# Patient Record
Sex: MALE | HISPANIC OR LATINO | Employment: FULL TIME | URBAN - METROPOLITAN AREA
[De-identification: names, ages, dates, MRNs, and addresses within clinical notes are randomized per-mention and may not be internally consistent; named-entity substitution may affect disease eponyms.]

---

## 2019-07-15 ENCOUNTER — RECORDS - HEALTHEAST (OUTPATIENT)
Dept: LAB | Facility: HOSPITAL | Age: 39
End: 2019-07-15

## 2019-07-15 LAB — VZV IGG SER QL IA: POSITIVE

## 2019-07-17 LAB
GAMMA INTERFERON BACKGROUND BLD IA-ACNC: 0.1 IU/ML
M TB IFN-G BLD-IMP: NEGATIVE
MITOGEN IGNF BCKGRD COR BLD-ACNC: -0.04 IU/ML
MITOGEN IGNF BCKGRD COR BLD-ACNC: -0.04 IU/ML
QTF INTERPRETATION: NORMAL
QTF MITOGEN - NIL: >10 IU/ML

## 2019-12-06 ENCOUNTER — COMMUNICATION - HEALTHEAST (OUTPATIENT)
Dept: TELEHEALTH | Facility: CLINIC | Age: 39
End: 2019-12-06

## 2019-12-06 ENCOUNTER — OFFICE VISIT - HEALTHEAST (OUTPATIENT)
Dept: FAMILY MEDICINE | Facility: CLINIC | Age: 39
End: 2019-12-06

## 2019-12-06 DIAGNOSIS — M1A.09X0 IDIOPATHIC CHRONIC GOUT OF MULTIPLE SITES WITHOUT TOPHUS: ICD-10-CM

## 2019-12-06 DIAGNOSIS — J45.40 MODERATE PERSISTENT ASTHMA WITHOUT COMPLICATION: ICD-10-CM

## 2019-12-06 ASSESSMENT — MIFFLIN-ST. JEOR: SCORE: 1827.99

## 2020-05-22 ENCOUNTER — COMMUNICATION - HEALTHEAST (OUTPATIENT)
Dept: TELEHEALTH | Facility: CLINIC | Age: 40
End: 2020-05-22

## 2020-12-20 ENCOUNTER — COMMUNICATION - HEALTHEAST (OUTPATIENT)
Dept: FAMILY MEDICINE | Facility: CLINIC | Age: 40
End: 2020-12-20

## 2020-12-22 ENCOUNTER — OFFICE VISIT - HEALTHEAST (OUTPATIENT)
Dept: FAMILY MEDICINE | Facility: CLINIC | Age: 40
End: 2020-12-22

## 2020-12-22 DIAGNOSIS — J45.40 MODERATE PERSISTENT ASTHMA WITHOUT COMPLICATION: ICD-10-CM

## 2020-12-22 DIAGNOSIS — M1A.09X0 IDIOPATHIC CHRONIC GOUT OF MULTIPLE SITES WITHOUT TOPHUS: ICD-10-CM

## 2020-12-22 RX ORDER — AZELASTINE HCL 205.5 UG/1
2 SPRAY NASAL DAILY PRN
Qty: 30 ML | Refills: 3 | Status: SHIPPED | OUTPATIENT
Start: 2020-12-22

## 2020-12-22 RX ORDER — ALBUTEROL SULFATE 90 UG/1
2 AEROSOL, METERED RESPIRATORY (INHALATION) EVERY 6 HOURS PRN
Qty: 2 INHALER | Refills: 3 | Status: SHIPPED | OUTPATIENT
Start: 2020-12-22 | End: 2022-01-19

## 2020-12-22 RX ORDER — COLCHICINE 0.6 MG/1
0.6 CAPSULE ORAL DAILY
Qty: 30 CAPSULE | Refills: 1 | Status: SHIPPED | OUTPATIENT
Start: 2020-12-22 | End: 2022-06-22

## 2021-05-28 ASSESSMENT — ASTHMA QUESTIONNAIRES
ACT_TOTALSCORE: 19
ACT_TOTALSCORE: 22

## 2021-06-03 VITALS
OXYGEN SATURATION: 96 % | DIASTOLIC BLOOD PRESSURE: 78 MMHG | HEART RATE: 71 BPM | WEIGHT: 206.9 LBS | HEIGHT: 68 IN | SYSTOLIC BLOOD PRESSURE: 114 MMHG | BODY MASS INDEX: 31.36 KG/M2

## 2021-06-04 NOTE — PROGRESS NOTES
ASSESSMENT:  1. Moderate persistent asthma without complication    We refilled all of his medications in regards to his asthma today.  He uses the Flovent a couple of times a day and I did increase him up to 2 puffs twice a day.  We can see how that impacts his usage of albuterol.  I did refill the Proventil for him in addition to the Combivent that he uses occasionally also.  He also uses the as a lasting nasal spray and I did refill that for him today as well.  We can follow-up with him in a month or so either by phone or having him come in to verify that his ACT is improving.      - fluticasone propionate (FLOVENT HFA) 220 mcg/actuation inhaler; Inhale 2 puffs 2 (two) times a day.  Dispense: 3 Inhaler; Refill: 3  - albuterol (PROVENTIL HFA) 90 mcg/actuation inhaler; Inhale 2 puffs every 6 (six) hours as needed for wheezing.  Dispense: 2 Inhaler; Refill: 3  - ipratropium-albuterol (COMBIVENT RESPIMAT)  mcg/actuation Mist inhaler; Inhale 1 puff 4 (four) times a day as needed.  Dispense: 1 Inhaler; Refill: 3  - azelastine (ASTEPRO) 0.15 % (205.5 mcg) Spry nasal spray; Apply 2 sprays into each nostril daily as needed.  Dispense: 30 mL; Refill: 3    2. Idiopathic chronic gout of multiple sites without tophus    I also did refill his colchicine which seems to work very well for gout flares when he gets them.  We did talk about possibly needing to look at his uric acid and he states that he has had that done before and that allopurinol did not work really well with him so he is wanting to stick with using the colchicine and I am fine with that right now.      - colchicine (MITIGARE) 0.6 mg capsule; Take 1 capsule (0.6 mg total) by mouth daily.  Dispense: 30 capsule; Refill: 1          PLAN:  There are no Patient Instructions on file for this visit.    No orders of the defined types were placed in this encounter.    Medications Discontinued During This Encounter   Medication Reason     ipratropium/albuterol sulfate  (COMBIVENT INHL)      fluticasone propionate (FLOVENT HFA) 220 mcg/actuation inhaler Reorder     albuterol (PROVENTIL HFA) 90 mcg/actuation inhaler Reorder     azelastine (ASTEPRO) 0.15 % (205.5 mcg) Spry nasal spray Reorder     colchicine (MITIGARE) 0.6 mg capsule Reorder       No follow-ups on file.    CHIEF COMPLAINT:  Chief Complaint   Patient presents with     [facility]     Inhaler refill, refill meds for gout, nasal spray for allergies       HISTORY OF PRESENT ILLNESS:  Zack is a 39 y.o. male presenting to the clinic today as a new patient.  He has moved here recently from [place].  He is establish care with us today.    He has a history of asthma, and notes that now that the cold weather is, he is using his inhalers a bit more.  He tends to have that pattern where he will use it more in the wintertime.  When he used to live in [place] it it was to the point where he hardly ever had to use anything at all.  He uses Flovent inhaler but has been out of that for a little bit.  He uses albuterol in combination with some Combivent inhalers so he gets a little bit of both of those when he needs to.  He does pretty well with this combination.    He also uses colchicine for gout flares.  He does not get them all that often but seems to have had more of them over the last year or so.  He has seen someone in the past about his uric acid level which evidently is pretty high but he did not tolerate the allopurinol in the past.        REVIEW OF SYSTEMS:     All other systems are negative.    PFSH:    Reviewed    Patient is new patient to the clinic. Please see past medical history, surgical history, social history and family history, all of which were completed in their entirety today.     TOBACCO USE:  Social History     Tobacco Use   Smoking Status Never Smoker   Smokeless Tobacco Never Used       VITALS:  Vitals:    12/06/19 0824   BP: 114/78   Patient Site: Left Arm   Patient Position: Sitting   Cuff Size:  "Adult Regular   Pulse: 71   SpO2: 96%   Weight: 206 lb 14.4 oz (93.8 kg)   Height: 5' 8\" (1.727 m)     Wt Readings from Last 3 Encounters:   12/06/19 206 lb 14.4 oz (93.8 kg)     Body mass index is 31.46 kg/m .    PHYSICAL EXAM:  Constitutional:  Well appearing patient in no acute distress.  Vitals:  Per nursing notes.    HEENT:  Normocephalic, atraumatic.  Ears are clear bilaterally, with no fluid or redness, and landmarks visible.  Pupils are equal and reactive to light, extraocular muscles intact, visual fields are full.  Nose is normal, and oropharynx is clear without redness.    Neck is without lymphadenopathy.    Lungs:  Clear to auscultation bilaterally without wheezes, rales or rhonchi.   Cardiac:  Regular rate and rhythm without murmurs, rubs, or gallops.     Legs show no cyanosis, clubbing or edema.  Palpation of the distal pulses are normal and symmetric.    Neurologic:  Cranial nerves II-XII intact.   Normal and symmetric reflexes in extremities, with normal strength and sensation.  Psychiatric:  Mood appropriate, memory intact.         MEDICATIONS:  Current Outpatient Medications   Medication Sig Dispense Refill     albuterol (PROVENTIL HFA) 90 mcg/actuation inhaler Inhale 2 puffs every 6 (six) hours as needed for wheezing. 2 Inhaler 3     azelastine (ASTEPRO) 0.15 % (205.5 mcg) Spry nasal spray Apply 2 sprays into each nostril daily as needed. 30 mL 3     colchicine (MITIGARE) 0.6 mg capsule Take 1 capsule (0.6 mg total) by mouth daily. 30 capsule 1     fluticasone propionate (FLOVENT HFA) 220 mcg/actuation inhaler Inhale 2 puffs 2 (two) times a day. 3 Inhaler 3     ipratropium-albuterol (COMBIVENT RESPIMAT)  mcg/actuation Mist inhaler Inhale 1 puff 4 (four) times a day as needed. 1 Inhaler 3     No current facility-administered medications for this visit.           "

## 2021-06-13 NOTE — PROGRESS NOTES
Zack Nelson is a 40 y.o. male who is being evaluated via a billable telephone visit.          ASSESSMENT:  1. Moderate persistent asthma without complication    I did refill his asthma medications for him.  As mentioned, he really does not use the Combivent very much but only when he needs to be out and about it does not bring his other inhalers with him.  He uses the Flovent daily or most days.    He only uses the albuterol rarely and he does use the azelastine pretty regularly as it helps with his allergies.    I suggested that he come in for a physical in the next couple of months and we can have him come in fasting and do a bit more before thorough work-up and get some labs done at that time.      - ipratropium-albuteroL (COMBIVENT RESPIMAT)  mcg/actuation Mist inhaler; Inhale 1 puff 4 (four) times a day as needed.  Dispense: 1 Inhaler; Refill: 3  - fluticasone propionate (FLOVENT HFA) 220 mcg/actuation inhaler; Inhale 2 puffs 2 (two) times a day.  Dispense: 3 Inhaler; Refill: 3  - albuterol (PROVENTIL HFA) 90 mcg/actuation inhaler; Inhale 2 puffs every 6 (six) hours as needed for wheezing.  Dispense: 2 Inhaler; Refill: 3  - azelastine (ASTEPRO) 0.15 % (205.5 mcg) Spry nasal spray; Apply 2 sprays into each nostril daily as needed.  Dispense: 30 mL; Refill: 3    2. Idiopathic chronic gout of multiple sites without tophus    I did refill his colchicine, he does not use that all that often either but he would like to have a fresh prescription should he need i.    - colchicine (MITIGARE) 0.6 mg capsule; Take 1 capsule (0.6 mg total) by mouth daily.  Dispense: 30 capsule; Refill: 1        Preventive Health Care:      PLAN:  There are no Patient Instructions on file for this visit.    No orders of the defined types were placed in this encounter.      No follow-ups on file.    CHIEF COMPLAINT:  Chief Complaint   Patient presents with     Medication Refill     Med check - for refills       HISTORY OF PRESENT  "ILLNESS:  Zack is a 40 y.o. male calling in to the clinic today for refills of his medications.  He has asthma and uses the inhalers as listed on above.  He seems to think that things are going pretty well and his asthma control test is under good control here today.  He has not been all that sick with any bit of illnesses this last year.  He uses his Flovent pretty much every day.  Uses the Combivent more rarely and the albuterol very rarely.    He also needs a refill of his colchicine which he uses very rarely for his gout only when he gets flares which have not been very frequently this past year.    REVIEW OF SYSTEMS:     All other systems are negative.    PFSH:    Reviewed      TOBACCO USE:  Social History     Tobacco Use   Smoking Status Never Smoker   Smokeless Tobacco Never Used         The visit lasted a total of 15 minutes   CA intake time  10 minutes    Telephone Consent was completed by  staff and confirmed by     I have reviewed and updated the patient's Past Medical History, Social History, Family History as appropriate.    MEDICATIONS: Reviewed and updated per CA and MD  Current Outpatient Medications   Medication Sig Dispense Refill     albuterol (PROVENTIL HFA) 90 mcg/actuation inhaler Inhale 2 puffs every 6 (six) hours as needed for wheezing. 2 Inhaler 3     azelastine (ASTEPRO) 0.15 % (205.5 mcg) Spry nasal spray Apply 2 sprays into each nostril daily as needed. 30 mL 3     colchicine (MITIGARE) 0.6 mg capsule Take 1 capsule (0.6 mg total) by mouth daily. 30 capsule 1     fluticasone propionate (FLOVENT HFA) 220 mcg/actuation inhaler Inhale 2 puffs 2 (two) times a day. 3 Inhaler 3     ipratropium-albuteroL (COMBIVENT RESPIMAT)  mcg/actuation Mist inhaler Inhale 1 puff 4 (four) times a day as needed. 1 Inhaler 3     No current facility-administered medications for this visit.            The patient has been notified of following:     \"This telephone visit will be conducted via a " "call between you and your physician/provider. We have found that certain health care needs can be provided without the need for a physical exam.  This service lets us provide the care you need with a short phone conversation.  If a prescription is necessary we can send it directly to your pharmacy.  If lab work is needed we can place an order for that and you can then stop by our lab to have the test done at a later time.    If during the course of the call the physician/provider feels a telephone visit is not appropriate, you will not be charged for this service.\"      The patient has been notified of following:     \"This telephone visit will be conducted via a call between you and your physician/provider. We have found that certain health care needs can be provided without the need for a physical exam.  This service lets us provide the care you need with a short phone conversation.  If a prescription is necessary we can send it directly to your pharmacy.  If lab work is needed we can place an order for that and you can then stop by our lab to have the test done at a later time.    Telephone visits are billed at different rates depending on your insurance coverage. During this emergency period, for some insurers they may be billed the same as an in-person visit.  Please reach out to your insurance provider with any questions.    If during the course of the call the physician/provider feels a telephone visit is not appropriate, you will not be charged for this service.\"    Patient has given verbal consent to a Telephone visit? Yes    What phone number would you like to be contacted at? N/A    Patient would like to receive their AVS by AVS Preference: Edson.        GILSON Maier MD   "

## 2021-06-16 PROBLEM — J45.40 MODERATE PERSISTENT ASTHMA WITHOUT COMPLICATION: Status: ACTIVE | Noted: 2019-12-06

## 2021-06-16 PROBLEM — M1A.09X0 IDIOPATHIC CHRONIC GOUT OF MULTIPLE SITES WITHOUT TOPHUS: Status: ACTIVE | Noted: 2019-12-06

## 2021-06-20 ENCOUNTER — HEALTH MAINTENANCE LETTER (OUTPATIENT)
Age: 41
End: 2021-06-20

## 2021-07-13 ENCOUNTER — VIRTUAL VISIT (OUTPATIENT)
Dept: URGENT CARE | Facility: CLINIC | Age: 41
End: 2021-07-13
Payer: COMMERCIAL

## 2021-07-13 DIAGNOSIS — R52 BODY ACHES: ICD-10-CM

## 2021-07-13 DIAGNOSIS — R50.9 FEVER AND CHILLS: Primary | ICD-10-CM

## 2021-07-13 DIAGNOSIS — R11.0 NAUSEA: ICD-10-CM

## 2021-07-13 DIAGNOSIS — R50.9 FEVER AND CHILLS: ICD-10-CM

## 2021-07-13 LAB — SARS-COV-2 RNA RESP QL NAA+PROBE: NEGATIVE

## 2021-07-13 PROCEDURE — 99213 OFFICE O/P EST LOW 20 MIN: CPT | Mod: TEL | Performed by: PHYSICIAN ASSISTANT

## 2021-07-13 NOTE — PROGRESS NOTES
Zack is a 40 year old who is being evaluated via a billable telephone visit.      Assessment & Plan     Fever and chills  - Symptomatic COVID-19 Virus (Coronavirus) by PCR Nasopharyngeal; Future    Nausea  - Symptomatic COVID-19 Virus (Coronavirus) by PCR Nasopharyngeal; Future    Body aches  - Symptomatic COVID-19 Virus (Coronavirus) by PCR Nasopharyngeal; Future    I will get covid testing and we will follow up with results and further recommendations.     Trish Rangel PA-C  [facility]    Subjective   Zack is a 40 year old who presents for the following health issues : flu like symptoms    HPI - last week he started having some stomach issues, diarrhea and nausea. He developed fevers, body aches and continued stomach cramping yesterday. He has not had a sore throat, has not had blood or mucus in stool. He has not had recent travel or exposure to new water or food sources. 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       Review of Systems   Constitutional, HEENT, cardiovascular, pulmonary, gi and gu systems are negative, except as otherwise noted.      Objective           Vitals:  No vitals were obtained today due to virtual visit.    Physical Exam   alert and no distress  PSYCH: Alert and oriented times 3; coherent speech, normal   rate and volume, able to articulate logical thoughts, able   to abstract reason, no tangential thoughts, no hallucinations   or delusions  His affect is normal  RESP: No cough, no audible wheezing, able to talk in full sentences  Remainder of exam unable to be completed due to telephone visits            Phone call duration: 5 minutes

## 2021-10-11 ENCOUNTER — HEALTH MAINTENANCE LETTER (OUTPATIENT)
Age: 41
End: 2021-10-11

## 2021-12-31 ENCOUNTER — OFFICE VISIT (OUTPATIENT)
Dept: FAMILY MEDICINE | Facility: CLINIC | Age: 41
End: 2021-12-31
Payer: COMMERCIAL

## 2021-12-31 ENCOUNTER — TELEPHONE (OUTPATIENT)
Dept: FAMILY MEDICINE | Facility: CLINIC | Age: 41
End: 2021-12-31

## 2021-12-31 VITALS
OXYGEN SATURATION: 95 % | HEART RATE: 72 BPM | DIASTOLIC BLOOD PRESSURE: 70 MMHG | WEIGHT: 203 LBS | BODY MASS INDEX: 30.77 KG/M2 | SYSTOLIC BLOOD PRESSURE: 116 MMHG | HEIGHT: 68 IN

## 2021-12-31 DIAGNOSIS — Z13.1 SCREENING FOR DIABETES MELLITUS: ICD-10-CM

## 2021-12-31 DIAGNOSIS — J45.40 MODERATE PERSISTENT ASTHMA WITHOUT COMPLICATION: ICD-10-CM

## 2021-12-31 DIAGNOSIS — M1A.09X0 IDIOPATHIC CHRONIC GOUT OF MULTIPLE SITES WITHOUT TOPHUS: ICD-10-CM

## 2021-12-31 DIAGNOSIS — Z13.220 SCREENING, LIPID: ICD-10-CM

## 2021-12-31 DIAGNOSIS — Z00.00 ROUTINE MEDICAL EXAM: Primary | ICD-10-CM

## 2021-12-31 PROCEDURE — 99396 PREV VISIT EST AGE 40-64: CPT | Performed by: FAMILY MEDICINE

## 2021-12-31 ASSESSMENT — MIFFLIN-ST. JEOR: SCORE: 1805.3

## 2021-12-31 ASSESSMENT — ASTHMA QUESTIONNAIRES
ACT_TOTALSCORE: 17
QUESTION_1 LAST FOUR WEEKS HOW MUCH OF THE TIME DID YOUR ASTHMA KEEP YOU FROM GETTING AS MUCH DONE AT WORK, SCHOOL OR AT HOME: NONE OF THE TIME
QUESTION_5 LAST FOUR WEEKS HOW WOULD YOU RATE YOUR ASTHMA CONTROL: SOMEWHAT CONTROLLED
QUESTION_4 LAST FOUR WEEKS HOW OFTEN HAVE YOU USED YOUR RESCUE INHALER OR NEBULIZER MEDICATION (SUCH AS ALBUTEROL): ONE OR TWO TIMES PER DAY
QUESTION_2 LAST FOUR WEEKS HOW OFTEN HAVE YOU HAD SHORTNESS OF BREATH: THREE TO SIX TIMES A WEEK
QUESTION_3 LAST FOUR WEEKS HOW OFTEN DID YOUR ASTHMA SYMPTOMS (WHEEZING, COUGHING, SHORTNESS OF BREATH, CHEST TIGHTNESS OR PAIN) WAKE YOU UP AT NIGHT OR EARLIER THAN USUAL IN THE MORNING: ONCE OR TWICE

## 2021-12-31 NOTE — PROGRESS NOTES
"  ASSESSMENT/PLAN:   (Z00.00) Routine medical exam  (primary encounter diagnosis)  Comment: Generally the patient is doing very well.  We discussed healthy lifestyles, including adequate exercise (3-4 times a week for 20-30 minutes), and a healthy diet.  Patient should return for annual physicals, and we can also see them here as needed.         (J45.40) Moderate persistent asthma without complication  Comment: He is doing well on his albuterol inhaler combined with the Flovent inhaler that he uses for his asthma.  That is worked well for him in the past and he was given refills as necessary today.        (M1A.09X0) Idiopathic chronic gout of multiple sites without tophus  Comment: We will check uric acid level today to see where he has had since his flare is resolved that we saw him for before.  We can follow-up on the results when they become available.      Plan: Uric acid            (Z13.220) Screening, lipid  Comment:   Plan: Lipid panel reflex to direct LDL Fasting            (Z13.1) Screening for diabetes mellitus  Comment:   Plan: Comprehensive metabolic panel              Patient has been advised of split billing requirements and indicates understanding: Yes  COUNSELING:   Reviewed preventive health counseling, as reflected in patient instructions       Regular exercise       Healthy diet/nutrition       Alcohol Use        Colon cancer screening       Prostate cancer screening    Estimated body mass index is 31.46 kg/m  as calculated from the following:    Height as of 12/6/19: 1.727 m (5' 8\").    Weight as of 12/6/19: 93.8 kg (206 lb 14.4 oz).     Weight management plan: Discussed healthy diet and exercise guidelines    He reports that he has never smoked. He has never used smokeless tobacco.        SUBJECTIVE:   CC: Zack Nelson is an 41 year old male who presents for preventative health visit.       Patient has been advised of split billing requirements and indicates understanding: Yes  Healthy " Habits:     Getting at least 3 servings of Calcium per day:  Yes    Bi-annual eye exam:  Yes    Dental care twice a year:  Yes    Sleep apnea or symptoms of sleep apnea:  None    Diet:  Regular (no restrictions)    Frequency of exercise:  None    Taking medications regularly:  Yes    Medication side effects:  None    PHQ-2 Total Score: 0    Additional concerns today:  No              Today's PHQ-2 Score:   PHQ-2 ( 1999 Pfizer) 12/31/2021   Q1: Little interest or pleasure in doing things 0   Q2: Feeling down, depressed or hopeless 0   PHQ-2 Score 0   Q1: Little interest or pleasure in doing things Not at all   Q2: Feeling down, depressed or hopeless Not at all   PHQ-2 Score 0       Abuse: Current or Past(Physical, Sexual or Emotional)- No  Do you feel safe in your environment? Yes    Have you ever done Advance Care Planning? (For example, a Health Directive, POLST, or a discussion with a medical provider or your loved ones about your wishes): No, advance care planning information given to patient to review.  Patient declined advance care planning discussion at this time.    Social History     Tobacco Use     Smoking status: Never Smoker     Smokeless tobacco: Never Used   Substance Use Topics     Alcohol use: Yes     Alcohol/week: 2.0 standard drinks         Alcohol Use 12/31/2021   Prescreen: >3 drinks/day or >7 drinks/week? No       Last PSA: No results found for: PSA    Reviewed orders with patient. Reviewed health maintenance and updated orders accordingly - Yes  Labs reviewed in EPIC  BP Readings from Last 3 Encounters:   12/31/21 116/70   12/06/19 114/78    Wt Readings from Last 3 Encounters:   12/31/21 92.1 kg (203 lb)   12/06/19 93.8 kg (206 lb 14.4 oz)                  Patient Active Problem List   Diagnosis     Moderate persistent asthma without complication     Idiopathic chronic gout of multiple sites without tophus     History reviewed. No pertinent surgical history.    Social History     Tobacco Use      Smoking status: Never Smoker     Smokeless tobacco: Never Used   Substance Use Topics     Alcohol use: Not Currently     Alcohol/week: 2.0 standard drinks     Family History   Problem Relation Age of Onset     Arthritis Mother      Hyperlipidemia Mother      Diabetes No family hx of      Cancer No family hx of          Current Outpatient Medications   Medication Sig Dispense Refill     albuterol (PROVENTIL HFA) 90 mcg/actuation inhaler [ALBUTEROL (PROVENTIL HFA) 90 MCG/ACTUATION INHALER] Inhale 2 puffs every 6 (six) hours as needed for wheezing. 2 Inhaler 3     azelastine (ASTEPRO) 0.15 % (205.5 mcg) Spry nasal spray [AZELASTINE (ASTEPRO) 0.15 % (205.5 MCG) SPRY NASAL SPRAY] Apply 2 sprays into each nostril daily as needed. 30 mL 3     colchicine (MITIGARE) 0.6 mg capsule [COLCHICINE (MITIGARE) 0.6 MG CAPSULE] Take 1 capsule (0.6 mg total) by mouth daily. 30 capsule 1     fluticasone propionate (FLOVENT HFA) 220 mcg/actuation inhaler [FLUTICASONE PROPIONATE (FLOVENT HFA) 220 MCG/ACTUATION INHALER] Inhale 2 puffs 2 (two) times a day. 3 Inhaler 3     ipratropium-albuteroL (COMBIVENT RESPIMAT)  mcg/actuation Mist inhaler [IPRATROPIUM-ALBUTEROL (COMBIVENT RESPIMAT)  MCG/ACTUATION MIST INHALER] Inhale 1 puff 4 (four) times a day as needed. 1 Inhaler 3     No Known Allergies    Reviewed and updated as needed this visit by clinical staff                Reviewed and updated as needed this visit by Provider               Past Medical History:   Diagnosis Date     Uncomplicated asthma       History reviewed. No pertinent surgical history.    Review of Systems  CONSTITUTIONAL: NEGATIVE for fever, chills, change in weight  INTEGUMENTARY/SKIN: NEGATIVE for worrisome rashes, moles or lesions  EYES: NEGATIVE for vision changes or irritation  ENT: NEGATIVE for ear, mouth and throat problems  RESP: NEGATIVE for significant cough or SOB  CV: NEGATIVE for chest pain, palpitations or peripheral edema  GI: NEGATIVE for  nausea, abdominal pain, heartburn, or change in bowel habits   male: negative for dysuria, hematuria, decreased urinary stream, erectile dysfunction, urethral discharge  MUSCULOSKELETAL: NEGATIVE for significant arthralgias or myalgia  NEURO: NEGATIVE for weakness, dizziness or paresthesias  PSYCHIATRIC: NEGATIVE for changes in mood or affect    OBJECTIVE:   There were no vitals taken for this visit.    Physical Exam  GENERAL: healthy, alert and no distress  EYES: Eyes grossly normal to inspection, PERRL and conjunctivae and sclerae normal  HENT: ear canals and TM's normal, nose and mouth without ulcers or lesions  NECK: no adenopathy, no asymmetry, masses, or scars and thyroid normal to palpation  RESP: lungs clear to auscultation - no rales, rhonchi or wheezes  CV: regular rate and rhythm, normal S1 S2, no S3 or S4, no murmur, click or rub, no peripheral edema and peripheral pulses strong  ABDOMEN: soft, nontender, no hepatosplenomegaly, no masses and bowel sounds normal  MS: no gross musculoskeletal defects noted, no edema  SKIN: no suspicious lesions or rashes  NEURO: Normal strength and tone, mentation intact and speech normal  PSYCH: mentation appears normal, affect normal/bright    Diagnostic Test Results:  Labs reviewed in Epic  No results found for any visits on 12/31/21.    Varun Langston MD  [facility]

## 2022-01-01 ASSESSMENT — ASTHMA QUESTIONNAIRES: ACT_TOTALSCORE: 17

## 2022-01-03 NOTE — PROGRESS NOTES
Patient is on our lab schedule for Thursday. Appt notes say fasting. Please review and place orders as needed. Thanks

## 2022-01-19 DIAGNOSIS — J45.40 MODERATE PERSISTENT ASTHMA WITHOUT COMPLICATION: ICD-10-CM

## 2022-01-19 NOTE — TELEPHONE ENCOUNTER
1-19-22  Reason for Call:  Medication    Do you use a [facility]?  [facility] on [address]     Name of the medication requested:   albuterol (PROVENTIL HFA) 90 mcg/actuation inhaler  &   fluticasone propionate (FLOVENT HFA) 220 mcg/actuation inhaler    Other request: none    Can we leave a detailed message on this number? YES    Phone number patient can be reached at: Cell number on file:    Telephone Information:   Mobile [phone]       Best Time: antyime    Call taken on 1/19/2022 at 1:39 PM by Sharyn Ma

## 2022-01-21 ENCOUNTER — MYC REFILL (OUTPATIENT)
Dept: FAMILY MEDICINE | Facility: CLINIC | Age: 42
End: 2022-01-21
Payer: COMMERCIAL

## 2022-01-21 DIAGNOSIS — J45.40 MODERATE PERSISTENT ASTHMA WITHOUT COMPLICATION: ICD-10-CM

## 2022-01-21 RX ORDER — ALBUTEROL SULFATE 90 UG/1
2 AEROSOL, METERED RESPIRATORY (INHALATION) EVERY 6 HOURS PRN
Qty: 36 G | Refills: 3 | Status: SHIPPED | OUTPATIENT
Start: 2022-01-21 | End: 2023-01-17

## 2022-01-22 ENCOUNTER — MYC REFILL (OUTPATIENT)
Dept: FAMILY MEDICINE | Facility: CLINIC | Age: 42
End: 2022-01-22
Payer: COMMERCIAL

## 2022-01-22 DIAGNOSIS — J45.40 MODERATE PERSISTENT ASTHMA WITHOUT COMPLICATION: ICD-10-CM

## 2022-01-24 NOTE — TELEPHONE ENCOUNTER
"Routing refill request to provider for review/approval because:  ACT score out of range.    Last Written Prescription Date:  12/22/20  Last Fill Quantity: 1,  # refills: 3   Last office visit provider:  12/21/21     Requested Prescriptions   Pending Prescriptions Disp Refills     ipratropium-albuterol (COMBIVENT RESPIMAT)  MCG/ACT inhaler       Sig: Inhale 1 puff into the lungs 4 times daily as needed       Asthma Maintenance Inhalers - Anticholinergics Failed - 1/21/2022 12:40 PM        Failed - Asthma control assessment score within normal limits in last 6 months     Please review ACT score.           Passed - Patient is age 12 years or older        Passed - Medication is active on med list        Passed - Recent (6 mo) or future (30 days) visit within the authorizing provider's specialty     Patient had office visit in the last 6 months or has a visit in the next 30 days with authorizing provider or within the authorizing provider's specialty.  See \"Patient Info\" tab in inbasket, or \"Choose Columns\" in Meds & Orders section of the refill encounter.           Short-Acting Beta Agonist Inhalers Protocol  Failed - 1/21/2022 12:40 PM        Failed - Asthma control assessment score within normal limits in last 6 months     Please review ACT score.           Passed - Patient is age 12 or older        Passed - Medication is active on med list        Passed - Recent (6 mo) or future (30 days) visit within the authorizing provider's specialty     Patient had office visit in the last 6 months or has a visit in the next 30 days with authorizing provider or within the authorizing provider's specialty.  See \"Patient Info\" tab in inbasket, or \"Choose Columns\" in Meds & Orders section of the refill encounter.           Asthma Nebs Protocol Failed - 1/21/2022 12:40 PM        Failed - Asthma control assessment score within normal limits in last 6 months     Please review ACT score.           Passed - Patient is age 4 years " "or older        Passed - Medication is active on med list        Passed - Recent (6 mo) or future (30 days) visit within the authorizing provider's specialty     Patient had office visit in the last 6 months or has a visit in the next 30 days with authorizing provider or within the authorizing provider's specialty.  See \"Patient Info\" tab in inbasket, or \"Choose Columns\" in Meds & Orders section of the refill encounter.                 Yobani Hernandez RN 01/24/22 8:11 AM  "

## 2022-01-24 NOTE — TELEPHONE ENCOUNTER
Last OV: 12/31/2021    Last filled: 12/22/2020    Patient states he is out of medication.      Valorie Evans

## 2022-01-27 DIAGNOSIS — J45.40 MODERATE PERSISTENT ASTHMA WITHOUT COMPLICATION: ICD-10-CM

## 2022-01-27 NOTE — TELEPHONE ENCOUNTER
Reason for Call:  Medication or medication refill:    Do you use a [facility]?  Name of the pharmacy and phone number for the current request:  [facility] [place]    Name of the medication requested: Flovent inhaler    Patient has been out of medication for a week    Can we leave a detailed message on this number? YES    Phone number patient can be reached at: Cell number on file:    Telephone Information:   Mobile [phone]       Best Time: anytime    Call taken on 1/27/2022 at 2:37 PM by Valorie Evans

## 2022-01-31 ENCOUNTER — TELEPHONE (OUTPATIENT)
Dept: FAMILY MEDICINE | Facility: CLINIC | Age: 42
End: 2022-01-31
Payer: COMMERCIAL

## 2022-01-31 NOTE — TELEPHONE ENCOUNTER
Received fax from pharmacy requesting a PA for the following medications:    ArmonAir Digihaler 232 mcg/ACT    Key: [id number]

## 2022-02-02 DIAGNOSIS — J45.40 MODERATE PERSISTENT ASTHMA WITHOUT COMPLICATION: Primary | ICD-10-CM

## 2022-02-02 RX ORDER — ALBUTEROL SULFATE 0.83 MG/ML
2.5 SOLUTION RESPIRATORY (INHALATION) EVERY 6 HOURS PRN
Qty: 90 ML | Refills: 3 | Status: SHIPPED | OUTPATIENT
Start: 2022-02-02

## 2022-02-02 RX ORDER — FLUTICASONE PROPIONATE 220 UG/1
1 AEROSOL, METERED RESPIRATORY (INHALATION) 2 TIMES DAILY
Qty: 12 G | Refills: 3 | Status: SHIPPED | OUTPATIENT
Start: 2022-02-02 | End: 2022-05-31

## 2022-02-02 NOTE — TELEPHONE ENCOUNTER
Spoke with covering provider and they did fill those for patient. Called patient and left message that those have been sent into his pharmacy for him.  Brittaney Mendiola LPN     10

## 2022-02-02 NOTE — TELEPHONE ENCOUNTER
Patient calling in requesting that his Flovent be filled. Insurance does not cover the Armonair Respiclick but states that Flovent has been covered this whole time. He has been out of medication for a month and is very upset. He states he would like to discuss this with someone. He feels like Dr. Langston is doing this on purpose because there is no other explanation for this. I did let him know it was not being done on purpose and I will personally work on this to get this filled for him so he does not have to go to [facility]. He states he has been using his hernan albuterol because he has had increase in wheezing and shortness of breath since being out of his daily inhaler. He is currently using albuterol Nebulizer and his albuterol inhaler to help control symptoms. He is requesting that Flovent and an albuterol neb be sent into his pharmacy so that he can manage his symptoms until his Flovent is back in his system. Dr. Langston is out of office today but back tomorrow 2/3. Will have a covering provider address refills.  Brittaney Mendiola LPN

## 2022-02-04 NOTE — TELEPHONE ENCOUNTER
Central Prior Authorization Team   Phone: [phone]    PA Initiation    Medication: ArmonAir Digihaler 232MCG/ACT aerosol powder  Insurance Company: [employer] - Phone [phone] Fax [phone]  Pharmacy Filling the Rx: [id number] [place], [place] - [address] AT [facility] [address]  Filling Pharmacy Phone: [phone]  Filling Pharmacy Fax:    Start Date: 2/4/2022   Manually faxed request

## 2022-02-07 NOTE — TELEPHONE ENCOUNTER
Prior Authorization Approval    Authorization Effective Date: 2/2/2022  Authorization Expiration Date: 2/2/2023  Medication: ArmonAir Digihaler 232MCG/ACT aerosol powder  Approved Dose/Quantity:    Reference #:     Insurance Company: [employer] - Phone [phone] Fax [phone]  Expected CoPay:       CoPay Card Available:      Foundation Assistance Needed:    Which Pharmacy is filling the prescription (Not needed for infusion/clinic administered): [id number] [place], [place] - [facility] [address] AT [facility]  Pharmacy Notified: Yes  Patient Notified: Yes

## 2022-05-03 ENCOUNTER — E-VISIT (OUTPATIENT)
Dept: URGENT CARE | Facility: CLINIC | Age: 42
End: 2022-05-03
Payer: COMMERCIAL

## 2022-05-03 DIAGNOSIS — R05.9 COUGH: Primary | ICD-10-CM

## 2022-05-03 PROCEDURE — 99421 OL DIG E/M SVC 5-10 MIN: CPT | Performed by: FAMILY MEDICINE

## 2022-05-03 NOTE — PATIENT INSTRUCTIONS
Dear Zack,      Based on your responses, you may have coronavirus (COVID-19). It also could be pneumonia or influenza. If you are shortness of breath or if that fever is not improving the coming 2-3 days you should be seen in clinic or .    Will I be tested for COVID-19?  We would like to test you for COVID. I have placed orders for this test.     To schedule: go to your [email or web address] home page and scroll down to the section that says  You have an appointment that needs to be scheduled  and click the large green button that says  Schedule Now  and follow the steps to find the next available openings.    If you are unable to complete these [email or web address] scheduling steps, please call [phone] to schedule your testing.     These guidelines are for isolating before returning to work, school or .     For employers, schools and day cares: This is an official notice for this person s medical guidelines for returning in-person.     For health care sites: The CDC gives different isolation and quarantine guidelines for healthcare sites, please check with these sites before arriving.     How do I self-isolate?  You isolate when you have symptoms of COVID or a test shows you have COVID, even if you don t have symptoms.     If you DO have symptoms:  o Stay home and away from others  - For at least 5 days after your symptoms started, AND   - You are fever free for 24 hours (with no medicine that reduces fever), AND  - Your other symptoms are better.  o Wear a mask for 10 full days any time you are around others.    If you DON T have symptoms:  o Stay at home and away from others for at least 5 days after your positive test.  o Wear a mask for 10 full days any time you are around others.    How can I take care of myself?  Over the counter medications may help with your symptoms such as runny or stuffy nose, cough, chills, or fever.  Talk to your care team about your options.     Some people are at high risk of severe illness  (for example, you have a weak immune system, you re 65 years or older, or you have certain medical problems). If your risk is high and your symptoms started in the last 5 to 7 days, we strongly recommend for you to get COVID treatment as soon as possible. Paxlovid, Molnupiravir and the monoclonal antibody treatments are proven safe and effective, make you feel better faster, and prevent hospitalization and death.       To schedule an appointment to discuss COVID treatment, request an appointment on [employer] (select  COVID-19 Treatment ) or call [facility] ([phone]), press 7.      Get lots of rest. Drink extra fluids (unless a doctor has told you not to)    Take Tylenol (acetaminophen) or ibuprofen for fever or pain. If you have liver or kidney problems, ask your family doctor if it's okay to take Tylenol or ibuprofen    Take over the counter medications for your symptoms, as directed by your doctor. You may also talk to your pharmacist.      If you have other health problems (like cancer, heart failure, an organ transplant or severe kidney disease): Call your specialty clinic if you don't feel better in the next 2 days.    Know when to call 911. Emergency warning signs include:  o Trouble breathing or shortness of breath  o Pain or pressure in the chest that doesn't go away  o Feeling confused like you haven't felt before, or not being able to wake up  o Bluish-colored lips or face    Where can I get more information?    [facility] - About COVID-19: [email or web address]."[email or web address]/     CDC - What to Do If You're Sick: https://www.cdc.gov/coronavirus/2019-ncov/if-you-are-sick/index.html     CDC - Quarantine & Isolation: https://www.cdc.gov/coronavirus/2019-ncov/your-health/quarantine-isolation.html     [facility] clinical trials (COVID-19 research studies): clinicalaffairs.[facility]/n-clinical-trials    Below are the COVID-19 hotlines at the [place] [facility] ([facility]).  Interpreters are available.  o For health questions: Call 260-324-5350 or 1-457.792.4622 (7 a.m. to 7 p.m.)  o For questions about schools and childcare: Call 587-139-5088 or 1-851.467.7847 (7 a.m. to 7 p.m.)

## 2022-05-31 ENCOUNTER — VIRTUAL VISIT (OUTPATIENT)
Dept: INTERNAL MEDICINE | Facility: CLINIC | Age: 42
End: 2022-05-31
Payer: COMMERCIAL

## 2022-05-31 DIAGNOSIS — J01.90 ACUTE SINUSITIS WITH SYMPTOMS GREATER THAN 10 DAYS: Primary | ICD-10-CM

## 2022-05-31 DIAGNOSIS — J30.81 ALLERGIC RHINITIS DUE TO ANIMALS: ICD-10-CM

## 2022-05-31 DIAGNOSIS — J45.40 MODERATE PERSISTENT ASTHMA WITHOUT COMPLICATION: ICD-10-CM

## 2022-05-31 PROCEDURE — 99214 OFFICE O/P EST MOD 30 MIN: CPT | Mod: GT | Performed by: INTERNAL MEDICINE

## 2022-05-31 RX ORDER — BUDESONIDE AND FORMOTEROL FUMARATE DIHYDRATE 160; 4.5 UG/1; UG/1
2 AEROSOL RESPIRATORY (INHALATION) 2 TIMES DAILY
Qty: 6 G | Refills: 1 | Status: SHIPPED | OUTPATIENT
Start: 2022-05-31 | End: 2022-08-24

## 2022-05-31 RX ORDER — AMOXICILLIN AND CLAVULANATE POTASSIUM 562.5; 437.5; 62.5 MG/1; MG/1; MG/1
2 TABLET, MULTILAYER, EXTENDED RELEASE ORAL 2 TIMES DAILY
Qty: 40 TABLET | Refills: 0 | Status: SHIPPED | OUTPATIENT
Start: 2022-05-31 | End: 2022-06-10

## 2022-05-31 NOTE — PROGRESS NOTES
Zack is a 41 year old who is being evaluated via a billable video visit.      How would you like to obtain your AVS? MyChart  If the video visit is dropped, the invitation should be resent by: Send to e-mail at: [email or web address]  Will anyone else be joining your video visit? No      Video Start Time: 1731      Office Visit - Follow Up   Zack Nelson   41 year old male    Date of Visit: 2022    Chief Complaint   Patient presents with     Sinus Problem     Persistent URI sxs's: Sinus pressure, facial pains, mild wheezing (Last seen [facility] on 22 & treated with Augmentin x 7 days) (using inhalers)         Assessment and Plan   1. Acute sinusitis with symptoms greater than 10 days  He has recurrent fever and sinusitis symptoms.  We will treat with high-dose Augmentin in case he has some resistant bacteria.  We he was warned about potential for diarrhea.  - amoxicillin-clavulanate (AUGMENTIN XR) 1000-62.5 MG 12 hr tablet; Take 2 tablets by mouth 2 times daily for 10 days  Dispense: 40 tablet; Refill: 0    2. Moderate persistent asthma without complication  He really should get back on a steroid inhaler.  He might benefit from a pulmonary consultation.  He needs to establish care with new doctor which is apparently occurring in the next few weeks.  Trial of Symbicort until then.  - budesonide-formoterol (SYMBICORT) 160-4.5 MCG/ACT Inhaler; Inhale 2 puffs into the lungs 2 times daily  Dispense: 6 g; Refill: 1    3.  Allergic rhinitis: He states this is better after their cat .  I do question if he has some seasonal allergic issues from pollens outside here in [place] as he is not from [place] originally.  Address further at his upcoming establish care visit.    Return in about 2 weeks (around 2022) for Follow up, with PCP only, in person.     History of Present Illness   This 41 year old this is a patient at the [facility] who has underlying asthma.  He is a respiratory therapist at  [employer].  He is just not felt well recently.  He has been having fevers to 103.  He has recurrent sinusitis symptoms with frontal sinus headache pain and purulent nasal discharge and cough and crackles and some wheezing.  Wife has similar and he has been sick frequently since the birth of their son.  He did take about a weeks worth of Augmentin and did get better but now is worse again.  Has allergies.  He has not been taking his steroid inhaler.    Review of Systems: A comprehensive review of systems was negative except as noted.     Medications, Allergies and Problem List   Reviewed, reconciled and updated  Post Discharge Medication Reconciliation Status:   Social History     Social History Narrative     Not on file        Physical Exam   General Appearance:   Pleasant gentleman who does not appear to be in the any acute respiratory distress on video    There were no vitals taken for this visit.         Additional Information   Current Outpatient Medications   Medication Sig Dispense Refill     albuterol (PROAIR HFA/PROVENTIL HFA/VENTOLIN HFA) 108 (90 Base) MCG/ACT inhaler Inhale 2 puffs into the lungs every 6 hours as needed 36 g 3     albuterol (PROVENTIL) (2.5 MG/3ML) 0.083% neb solution Take 1 vial (2.5 mg) by nebulization every 6 hours as needed for shortness of breath / dyspnea or wheezing 90 mL 3     amoxicillin-clavulanate (AUGMENTIN XR) 1000-62.5 MG 12 hr tablet Take 2 tablets by mouth 2 times daily for 10 days 40 tablet 0     budesonide-formoterol (SYMBICORT) 160-4.5 MCG/ACT Inhaler Inhale 2 puffs into the lungs 2 times daily 6 g 1     colchicine (MITIGARE) 0.6 mg capsule [COLCHICINE (MITIGARE) 0.6 MG CAPSULE] Take 1 capsule (0.6 mg total) by mouth daily. 30 capsule 1     ipratropium-albuterol (COMBIVENT RESPIMAT)  MCG/ACT inhaler Inhale 1 puff into the lungs 4 times daily as needed 1 each 3     azelastine (ASTEPRO) 0.15 % (205.5 mcg) Spry nasal spray [AZELASTINE (ASTEPRO) 0.15 % (205.5  MCG) SPRY NASAL SPRAY] Apply 2 sprays into each nostril daily as needed. (Patient not taking: Reported on 5/31/2022) 30 mL 3     No Known Allergies  Social History     Tobacco Use     Smoking status: Never Smoker     Smokeless tobacco: Never Used   Substance Use Topics     Alcohol use: Not Currently     Alcohol/week: 2.0 standard drinks     Drug use: Never       Review and/or order of clinical lab tests:  Review and/or order of radiology tests:  Review and/or order of medicine tests:  Discussion of test results with performing physician:  Decision to obtain old records and/or obtain history from someone other than the patient:  Review and summarization of old records and/or obtaining history from someone other than the patient and.or discussion of case with another health care provider:  Independent visualization of image, tracing or specimen itself:    Time:      AMADOU JENKINS MD    Video-Visit Details    Type of service:  Video Visit    Video End Time:1746    Originating Location (pt. Location): Home    Distant Location (provider location):  [facility]     Platform used for Video Visit: [employer]

## 2022-06-02 ENCOUNTER — MYC MEDICAL ADVICE (OUTPATIENT)
Dept: INTERNAL MEDICINE | Facility: CLINIC | Age: 42
End: 2022-06-02
Payer: COMMERCIAL

## 2022-06-02 DIAGNOSIS — J45.40 MODERATE PERSISTENT ASTHMA WITHOUT COMPLICATION: Primary | ICD-10-CM

## 2022-06-02 RX ORDER — PREDNISONE 20 MG/1
40 TABLET ORAL DAILY
Qty: 10 TABLET | Refills: 0 | Status: SHIPPED | OUTPATIENT
Start: 2022-06-02 | End: 2022-06-07

## 2022-06-22 ENCOUNTER — OFFICE VISIT (OUTPATIENT)
Dept: FAMILY MEDICINE | Facility: CLINIC | Age: 42
End: 2022-06-22
Payer: COMMERCIAL

## 2022-06-22 VITALS
HEART RATE: 60 BPM | SYSTOLIC BLOOD PRESSURE: 108 MMHG | BODY MASS INDEX: 31.55 KG/M2 | WEIGHT: 208.2 LBS | DIASTOLIC BLOOD PRESSURE: 68 MMHG | HEIGHT: 68 IN

## 2022-06-22 DIAGNOSIS — J45.40 MODERATE PERSISTENT ASTHMA WITHOUT COMPLICATION: ICD-10-CM

## 2022-06-22 DIAGNOSIS — Z13.89 SCREENING FOR GOUT: Primary | ICD-10-CM

## 2022-06-22 DIAGNOSIS — M1A.09X0 IDIOPATHIC CHRONIC GOUT OF MULTIPLE SITES WITHOUT TOPHUS: ICD-10-CM

## 2022-06-22 DIAGNOSIS — Z13.1 SCREENING FOR DIABETES MELLITUS: ICD-10-CM

## 2022-06-22 DIAGNOSIS — Z13.220 SCREENING FOR HYPERLIPIDEMIA: ICD-10-CM

## 2022-06-22 PROCEDURE — 99214 OFFICE O/P EST MOD 30 MIN: CPT | Performed by: INTERNAL MEDICINE

## 2022-06-22 RX ORDER — COLCHICINE 0.6 MG/1
0.6 CAPSULE ORAL DAILY PRN
Qty: 30 CAPSULE | Refills: 1 | Status: SHIPPED | OUTPATIENT
Start: 2022-06-22 | End: 2023-02-01

## 2022-06-22 ASSESSMENT — ASTHMA QUESTIONNAIRES
ACT_TOTALSCORE: 22
QUESTION_4 LAST FOUR WEEKS HOW OFTEN HAVE YOU USED YOUR RESCUE INHALER OR NEBULIZER MEDICATION (SUCH AS ALBUTEROL): ONCE A WEEK OR LESS
QUESTION_2 LAST FOUR WEEKS HOW OFTEN HAVE YOU HAD SHORTNESS OF BREATH: ONCE OR TWICE A WEEK
ACT_TOTALSCORE: 22
QUESTION_1 LAST FOUR WEEKS HOW MUCH OF THE TIME DID YOUR ASTHMA KEEP YOU FROM GETTING AS MUCH DONE AT WORK, SCHOOL OR AT HOME: NONE OF THE TIME
QUESTION_5 LAST FOUR WEEKS HOW WOULD YOU RATE YOUR ASTHMA CONTROL: WELL CONTROLLED
QUESTION_3 LAST FOUR WEEKS HOW OFTEN DID YOUR ASTHMA SYMPTOMS (WHEEZING, COUGHING, SHORTNESS OF BREATH, CHEST TIGHTNESS OR PAIN) WAKE YOU UP AT NIGHT OR EARLIER THAN USUAL IN THE MORNING: NOT AT ALL

## 2022-06-22 NOTE — PATIENT INSTRUCTIONS
Let me know if the rash seems to be related to the symbicort and we can try something else.    Agree with using the mouth guard for sleep as you have been.    Labs when able at the [facility] lab as we discussed.    Let me know if issues come up.    Wily Lord MD

## 2022-06-22 NOTE — PROGRESS NOTES
"  Assessment & Plan     Screening for hyperlipidemia  - Lipid panel reflex to direct LDL Fasting; Future    Idiopathic chronic gout of multiple sites without tophus  Will give colchicine for when needed, screening labs today as noted,   - colchicine (MITIGARE) 0.6 MG capsule; Take 1 capsule (0.6 mg) by mouth daily as needed (gout flare)  - Comprehensive metabolic panel; Future    Screening for gout  - Uric acid; Future    Screening for diabetes mellitus  - Hemoglobin A1c; Future    Moderate persistent asthma- doing well today, some possible reaction to the symbicort, may consider Dulera or Advair if needed.    Patient Instructions   Let me know if the rash seems to be related to the symbicort and we can try something else.    Agree with using the mouth guard for sleep as you have been.    Labs when able at the [facility] lab as we discussed.    Let me know if issues come up.    Wily Lord MD               BMI:   Estimated body mass index is 31.37 kg/m  as calculated from the following:    Height as of this encounter: 1.735 m (5' 8.31\").    Weight as of this encounter: 94.4 kg (208 lb 3.2 oz).   Weight management plan: Discussed healthy diet and exercise guidelines        Return in about 6 months (around 12/22/2022).    Wily Lord MD  [facility]    Misa Dixon is a 41 year old accompanied by his no one., presenting for the following health issues:  Establish Care      HPI     Here to establish care today.     Wondering about sleep study- has been trying mouth guard and has been helping. Helped with snoring- wife noting that sleeping better. Sleep going better with good sleep.     Asthma doing well. Has been flovent but had to change to symbicort. Did start breaking out some on this. Stopped the symbicort- wondering if this is it. Felt that working well. asthma control test score of 22 today.     Gout has been rare in the past. Only had one major flare in the " "last 5 years. Only flaring once per year. Typically in toes. Does not drink and avoids red meat.           Review of Systems         Objective    /68 (BP Location: Left arm, Patient Position: Sitting, Cuff Size: Adult Large)   Pulse 60   Ht 1.735 m (5' 8.31\")   Wt 94.4 kg (208 lb 3.2 oz)   BMI 31.37 kg/m    Body mass index is 31.37 kg/m .  Physical Exam   General: alert, interactive, NAD  HEENT: sclerae clear  Neck: supple, no LAD  CV: RRR, no m/r/c  Resp: clear, no wheezing, easy work of breathing  Ext: warm and well perfused,  no edema  Psych: appropriate affect  Neuro: no focal deficits noted.                        .  ..  "

## 2022-06-24 ENCOUNTER — LAB (OUTPATIENT)
Dept: LAB | Facility: CLINIC | Age: 42
End: 2022-06-24
Payer: COMMERCIAL

## 2022-06-24 DIAGNOSIS — M1A.09X0 IDIOPATHIC CHRONIC GOUT OF MULTIPLE SITES WITHOUT TOPHUS: ICD-10-CM

## 2022-06-24 DIAGNOSIS — Z13.220 SCREENING FOR HYPERLIPIDEMIA: ICD-10-CM

## 2022-06-24 DIAGNOSIS — Z13.89 SCREENING FOR GOUT: ICD-10-CM

## 2022-06-24 DIAGNOSIS — Z13.1 SCREENING FOR DIABETES MELLITUS: ICD-10-CM

## 2022-06-24 LAB
ALBUMIN SERPL-MCNC: 4 G/DL (ref 3.5–5)
ALP SERPL-CCNC: 83 U/L (ref 45–120)
ALT SERPL W P-5'-P-CCNC: 27 U/L (ref 0–45)
ANION GAP SERPL CALCULATED.3IONS-SCNC: 5 MMOL/L (ref 5–18)
AST SERPL W P-5'-P-CCNC: 19 U/L (ref 0–40)
BILIRUB SERPL-MCNC: 1 MG/DL (ref 0–1)
BUN SERPL-MCNC: 17 MG/DL (ref 8–22)
CALCIUM SERPL-MCNC: 9.8 MG/DL (ref 8.5–10.5)
CHLORIDE BLD-SCNC: 108 MMOL/L (ref 98–107)
CHOLEST SERPL-MCNC: 212 MG/DL
CO2 SERPL-SCNC: 29 MMOL/L (ref 22–31)
CREAT SERPL-MCNC: 1.01 MG/DL (ref 0.7–1.3)
FASTING STATUS PATIENT QL REPORTED: YES
GFR SERPL CREATININE-BSD FRML MDRD: >90 ML/MIN/1.73M2
GLUCOSE BLD-MCNC: 89 MG/DL (ref 70–125)
HBA1C MFR BLD: 5.5 %
HDLC SERPL-MCNC: 48 MG/DL
LDLC SERPL CALC-MCNC: 142 MG/DL
POTASSIUM BLD-SCNC: 4.1 MMOL/L (ref 3.5–5)
PROT SERPL-MCNC: 7.5 G/DL (ref 6–8)
SODIUM SERPL-SCNC: 142 MMOL/L (ref 136–145)
TRIGL SERPL-MCNC: 109 MG/DL
URATE SERPL-MCNC: 8.8 MG/DL (ref 3–8)

## 2022-06-24 PROCEDURE — 80053 COMPREHEN METABOLIC PANEL: CPT

## 2022-06-24 PROCEDURE — 83036 HEMOGLOBIN GLYCOSYLATED A1C: CPT

## 2022-06-24 PROCEDURE — 84550 ASSAY OF BLOOD/URIC ACID: CPT

## 2022-06-24 PROCEDURE — 36415 COLL VENOUS BLD VENIPUNCTURE: CPT

## 2022-06-24 PROCEDURE — 80061 LIPID PANEL: CPT

## 2022-08-03 ENCOUNTER — LAB (OUTPATIENT)
Dept: FAMILY MEDICINE | Facility: CLINIC | Age: 42
End: 2022-08-03
Attending: FAMILY MEDICINE
Payer: COMMERCIAL

## 2022-08-03 DIAGNOSIS — Z20.822 SUSPECTED 2019 NOVEL CORONAVIRUS INFECTION: ICD-10-CM

## 2022-08-03 LAB — SARS-COV-2 RNA RESP QL NAA+PROBE: POSITIVE

## 2022-08-03 PROCEDURE — U0003 INFECTIOUS AGENT DETECTION BY NUCLEIC ACID (DNA OR RNA); SEVERE ACUTE RESPIRATORY SYNDROME CORONAVIRUS 2 (SARS-COV-2) (CORONAVIRUS DISEASE [COVID-19]), AMPLIFIED PROBE TECHNIQUE, MAKING USE OF HIGH THROUGHPUT TECHNOLOGIES AS DESCRIBED BY CMS-2020-01-R: HCPCS

## 2022-08-03 PROCEDURE — U0005 INFEC AGEN DETEC AMPLI PROBE: HCPCS

## 2022-08-04 ENCOUNTER — MYC MEDICAL ADVICE (OUTPATIENT)
Dept: FAMILY MEDICINE | Facility: CLINIC | Age: 42
End: 2022-08-04

## 2022-08-05 NOTE — TELEPHONE ENCOUNTER
Patient already has appointment today, 8/5/2022 2:00 PM with Dr. Lencho Dong, [place]  [facility]

## 2022-08-24 ENCOUNTER — E-VISIT (OUTPATIENT)
Dept: URGENT CARE | Facility: CLINIC | Age: 42
End: 2022-08-24
Payer: COMMERCIAL

## 2022-08-24 ENCOUNTER — OFFICE VISIT (OUTPATIENT)
Dept: FAMILY MEDICINE | Facility: CLINIC | Age: 42
End: 2022-08-24
Payer: COMMERCIAL

## 2022-08-24 VITALS
TEMPERATURE: 98.5 F | OXYGEN SATURATION: 97 % | HEART RATE: 66 BPM | WEIGHT: 206 LBS | RESPIRATION RATE: 16 BRPM | SYSTOLIC BLOOD PRESSURE: 139 MMHG | BODY MASS INDEX: 31.04 KG/M2 | DIASTOLIC BLOOD PRESSURE: 88 MMHG

## 2022-08-24 DIAGNOSIS — R36.9 PENILE DISCHARGE: ICD-10-CM

## 2022-08-24 DIAGNOSIS — R30.0 DYSURIA: Primary | ICD-10-CM

## 2022-08-24 DIAGNOSIS — R30.0 DIFFICULT OR PAINFUL URINATION: Primary | ICD-10-CM

## 2022-08-24 DIAGNOSIS — R35.0 URINARY FREQUENCY: Primary | ICD-10-CM

## 2022-08-24 DIAGNOSIS — Z11.3 SCREEN FOR STD (SEXUALLY TRANSMITTED DISEASE): ICD-10-CM

## 2022-08-24 LAB
ALBUMIN UR-MCNC: NEGATIVE MG/DL
APPEARANCE UR: CLEAR
BILIRUB UR QL STRIP: NEGATIVE
COLOR UR AUTO: YELLOW
GLUCOSE UR STRIP-MCNC: NEGATIVE MG/DL
HGB UR QL STRIP: NEGATIVE
KETONES UR STRIP-MCNC: NEGATIVE MG/DL
LEUKOCYTE ESTERASE UR QL STRIP: NEGATIVE
NITRATE UR QL: NEGATIVE
PH UR STRIP: 6 [PH] (ref 5–8)
SP GR UR STRIP: 1.02 (ref 1–1.03)
UROBILINOGEN UR STRIP-ACNC: 0.2 E.U./DL

## 2022-08-24 PROCEDURE — 99000 SPECIMEN HANDLING OFFICE-LAB: CPT | Performed by: PHYSICIAN ASSISTANT

## 2022-08-24 PROCEDURE — 99207 PR NON-BILLABLE SERV PER CHARTING: CPT | Performed by: EMERGENCY MEDICINE

## 2022-08-24 PROCEDURE — 87798 DETECT AGENT NOS DNA AMP: CPT | Mod: 90 | Performed by: PHYSICIAN ASSISTANT

## 2022-08-24 PROCEDURE — 99213 OFFICE O/P EST LOW 20 MIN: CPT | Performed by: PHYSICIAN ASSISTANT

## 2022-08-24 PROCEDURE — 87591 N.GONORRHOEAE DNA AMP PROB: CPT | Performed by: PHYSICIAN ASSISTANT

## 2022-08-24 PROCEDURE — 99207 PR NON-BILLABLE SERV PER CHARTING: CPT | Performed by: NURSE PRACTITIONER

## 2022-08-24 PROCEDURE — 81003 URINALYSIS AUTO W/O SCOPE: CPT

## 2022-08-24 PROCEDURE — 87491 CHLMYD TRACH DNA AMP PROBE: CPT | Performed by: PHYSICIAN ASSISTANT

## 2022-08-24 NOTE — PATIENT INSTRUCTIONS
Urine test is negative  You have screening test for Ureaplasma and STD screening with gonorrhea and chlamydia.  You will be notified of test results and treatment as indicated by the test.  You may fall in real-time the results in the [facility] [facility] patient portal.  Follow up with primary MD for reevaluation and treatment.    Abstain from sexual intercourse until your testing is returned.  Use condoms and barrier method to help prevent future STD exposures.  Follow-up with your primary care provider for reevaluation of your symptoms and interpretation of your labs for STD screening.      Patient Education   What Are Sexually Transmitted Diseases (STDs)?  A sexually transmitted disease (STD) is a disease that is spread during sex. (An STD can also be called STI for sexually transmitted infection.) You can become infected with an STD if you have sex with someone who has an STD. Any sex that involves the penis, vagina, anus, or mouth can spread disease. Some STDs spread through body fluids such as semen, vaginal fluid, or blood. Others spread through contact with affected skin.  Who is at risk?     Places on or in the body where STDs cause damage include reproductive organs, the rectum, and the mouth.   It doesn t matter if you re straight or kwan, male or female, young or old. Any person who has sex can get an STD. Your risk increases if:  You have more than one partner. The more partners you have, the greater your risk.  Your partner has other partners. If your partner is exposed to an STD, you could be, too.  You or your partner have had sex with other people in the past. Either of you might be carrying an STD from an earlier partner.  You have an STD. The STD may cause sores or other health problems that increase your risk of new infections. Your risk will stay high unless you change the behaviors that put you at risk of the current infection.  Prevent future problems  Left untreated, certain STDs can lead to cancer or  even death. Some can harm unborn babies whose mothers are infected. Others can cause you to not be able to have children (sterility) or can affect changes in behavior or your ability to think. You can prevent these problems with safer sex, regular checkups, and early treatment. Always use a latex condom when you have sex. Get tested if you re at risk. And get treated early if you have an STD.  Getting checked  The only sure way to know if you have an STD is to get checked by a healthcare provider. If you notice a change in how your body looks or feels, have it checked out. But keep in mind, STDs don t always show symptoms. So if you re at risk of STDs, get checked regularly. If you find you have an STD, be sure your partner gets treatment, too. If not, his or her health is at risk. And left untreated, your partner could pass the STD back to you, or on to others.  Common symptoms  Be alert to any changes in your body and your partner s body. Symptoms may appear in or near the vagina, penis, rectum, mouth, or throat. They include:  Unusual discharge  Lumps, bumps, or rashes  Sores that may be painful, itchy, or painless  Itchy skin  Burning with urination  Pain in the pelvis, belly (abdomen), or rectum  Even if you don t have symptoms  You may have an STD, even if you don t have symptoms. If you think you are at risk, get checked. Go to a clinic or to your healthcare provider. If your partner has an STD, you need to be tested too, even if you feel fine.  Vaccines to prevent disease  Vaccines (also called immunizations) are available to prevent hepatitis A and hepatitis B. These are two kinds of STDs. There is also a vaccine to prevent HPV. This is a virus that can be passed from person to person through sexual contact. Ask your healthcare provider whether any of these vaccines is right for you.   Date Last Reviewed: 11/1/2016 2000-2017 [employer]. [address] [place] [address], [place] [address]. All rights  reserved. This information is not intended as a substitute for professional medical care. Always follow your healthcare professional's instructions.         Patient Education   Understanding STDs    When it comes to sex, nothing is risk-free. Any sexual contact with the penis, vagina, anus, or mouth can spread a sexually transmitted disease (STD). The only sure way to prevent STDs is abstinence (not having sex). But there are ways to make sex safer. Use a latex condom each time you have sex. And choose your partner wisely.  Use condoms for safer sex  If you have sex, latex condoms provide the best protection against STDs. Latex condoms stop the exchange of body fluids that carry certain STDs. They also limit contact with affected skin. Be aware though, a condom doesn t cover all skin. So, affected skin that is not covered can still transfer disease. But you re safer with a condom than without one. Use a condom even if you use other birth control. While birth control methods like the pill or IUD help prevent pregnancy, they do not protect against STDs.  Choose the right condom  Condoms made of latex prevent disease best. If you re allergic to latex, use polyurethane condoms instead. Male condoms fit over the penis. Female condoms line the vagina. Before buying a condom, read the label to be sure it prevents disease. Some novelty condoms don t.  The right lubricant helps  Buy lubricated condoms or use lubricant. This provides greater comfort and reduces the risk of condom breakage. Use only water-based lubricants. Don t use oil, lotion, or petroleum jelly. They can weaken the condom, causing breakage. Also, you may want to choose lubricants without nonoxynol-9. It s now known that this spermicide does not prevent disease and may cause irritation.  Use condoms correctly  For condoms to work, they must be used the right way. Keep these tips in mind:  Use a new latex condom each time you have sex. Slip the condom on the  penis before any contact is made.  When ready to withdraw, hold the rim of the condom as the penis pulls out. This prevents the condom from slipping off.  Check the expiration date before using a condom.  Don t store condoms in places that can get hot, such as a car or a wallet that is carried in a back pocket.  Get to know your partner  Safer sex is a process. It involves getting to know your partner and making informed choices. Ask each other how many partners you have had in the past, and how many you have now. Find out if either of you has an STD. If you decide to have sex, use a condom each time. Don t stop using condoms unless you re sure neither of you has other partners and you ve both been tested to confirm you don t have STDs. Then stay free of disease by having sex only with each other (monogamy).  Keep your cool  Don t let alcohol or drugs cloud your judgment. They could lead you to have sex with someone you wouldn t have chosen if you were sober. Or, you might forget to use a condom. If you do plan to have sex, keep a latex condom with you. Don t wait until you re in the heat of passion to try to find one.  Consider abstinence  The only way to be sure you won t get an STD is to abstain from sex. Abstinence is a choice that many people make at some point in their lives. Maybe you want to wait until you are sure you re ready before you have sex. Maybe you d like a break from the responsibilities of sex for a while. Or maybe you just want to know your partner better before taking the next step. Abstinence is a choice you can make now to protect your future.  Date Last Reviewed: 12/1/2016 2000-2017 [employer]. [address] [place] [address], [place] [address]. All rights reserved. This information is not intended as a substitute for professional medical care. Always follow your healthcare professional's instructions.         Patient Education   If You Think You Have an STD  Treating a sexually  transmitted disease (STD) early limits the problems they can cause. If you have an STD, get treated right away. Ask your partner to be tested, too. Then avoid sex until you ve finished treatment and your healthcare provider says it s OK to have sex again.    Follow your treatment plan  Treatment depends on the type of STD you have. Common treatments include injections and oral pills or liquids. Creams and gels can be applied to sores caused by certain STDs. Follow the tips below:  Get new treatment for each new STD.  Don t use old medicine, even for the same STD. Use medicines as directed.  Don t share medicine unless instructed to do so by your healthcare provider or clinic.  Talk to your partner  If you have an STD, it s your duty to tell all your recent partners so they can be tested and treated. This is one important way to prevent the disease from being spread. Telling a partner that you have an STD can be hard. You may be embarrassed, angry, or afraid. It s often unclear who had the STD first. So try not to place blame. Your healthcare provider may offer some advice on how to begin.  Prevent future problems  Even after you ve been treated, you can still be infected again. This is a common problem. It happens when a partner passes the STD back to you. To avoid this, your partner must be tested. He or she may also need treatment. After treatment, go to any scheduled follow-up visits. Then prevent future problems with safer sex. Limit your number of partners and always use a latex condom.  Diagnosing STDS  Your healthcare provider will take a health history and examine you. During your health history, you will be asked about your sex habits and health history. You may also be asked about drug use. Give honest answers. Your healthcare provider will then check your body for signs of STDs. He or she also may perform one or more of the following tests:  Fluid is swabbed from any sores. Samples also may be taken from  the vagina, penis, mouth, or rectum. The samples are then tested for STDs.  Blood or urine samples may be taken. They are checked for viruses or bacteria that cause STDs.  For women, cells from the cervix (where the vagina and uterus meet) are checked for signs of cancer. This is called a Pap smear. If cell changes are found, a magnifying scope may be used to take a closer look (colposcopy).   Date Last Reviewed: 12/1/2016 2000-2017 [employer]. [address] [place] [id number]. All rights reserved. This information is not intended as a substitute for professional medical care. Always follow your healthcare professional's instructions.         Patient Education   Suspected STI, Culture Only  Your symptoms suggest that you may have a sexually transmitted infection (STI). The most common bacteria that cause STIs are chlamydia and gonorrhea. Both are highly contagious. They are passed by sexual contact with an infected partner.  Symptoms begin within 1 to 3 weeks after exposure. There is usually a discharge from the penis or vagina and burning during urination. Many women with one of these infections will have only mild symptoms or no symptoms at all early in the disease.  Culture tests have been taken. These will show if you have an infection with chlamydia or gonorrhea. These infections can be treated and cured with antibiotic medicine.  Home care  Do not have sex until you know that your test result is negative.  If a culture was done, call for the results. If the culture is positive, contact your healthcare provider, local clinic, or local public health department to be treated, or return to our facility.  You will be prescribed antibiotic medicine. Be sure to take all of the antibiotic as prescribed until it is gone or you are told to stop. Keep taking it even if you feel better.  Both you and your sexual partner or partners need to be treated, even if the partner has no symptoms.  Do not  have sex until both you and your partner or partners have finished all antibiotic medicine and you are told that you are no longer contagious.  Learn about  safe sex  practices and use these in the future. The safest sex is with a partner who has tested negative for STIs and only has sex with you. Condoms can help prevent the spread of gonorrhea and chlamydia, but are not a guarantee.  Follow-up care  Follow up with your healthcare provider or as advised by our staff. Call as directed for the results of your culture. If your culture test is positive and you are treated with an antibiotic, you should have another culture taken 4 to 6 weeks after treatment. This is to be sure the infection has cleared. Follow up with your provider or the public health department for complete STI screening, including HIV testing. For more information about STIs, call the CDC information line at 980-640-3472.  When to seek medical advice  Call your healthcare provider if any of these occur:  Fever of 100.4 F (38.0 C) or higher, or as directed  New pain in your lower belly (abdomen) or back or pain that gets worse  Unexpected vaginal bleeding  Weakness, dizziness, or fainting  Repeated vomiting  Inability to urinate because of pain  Rash or joint pain  Painful open sores on the penis or around the outer vagina  Enlarged painful lumps (lymph nodes) in the groin  Testicle pain or scrotal swelling in men  Date Last Reviewed: 9/25/2015 2000-2017 [employer]. [address] [place] [id number]. All rights reserved. This information is not intended as a substitute for professional medical care. Always follow your healthcare professional's instructions.

## 2022-08-24 NOTE — PATIENT INSTRUCTIONS
Dear Zack Sharma, I am sorry but you need to be examined and your discharge needs to be tested to determine the cause of your infection. Please come into urgent care today.      We are sorry you are not feeling well. Based on the responses you provided, it is recommended that you be seen in-person in urgent care so we can better evaluate your symptoms. Please click here to find the nearest urgent care location to you.   You will not be charged for this Visit. Thank you for trusting us with your care.    Easton Condon MD

## 2022-08-24 NOTE — PATIENT INSTRUCTIONS
Dear Zack Nelson,    We are sorry you are not feeling well. Based on the responses you provided, it is recommended that you be seen in-person in urgent care so we can better evaluate your symptoms. Please click here to find the nearest urgent care location to you.   You will not be charged for this Visit. Thank you for trusting us with your care.    DANNIE Arriaga CNP

## 2022-08-24 NOTE — PROGRESS NOTES
Patient presents with:  Urinary Problem: X 5 day. Burning. Some discharge. Frequency. Urgency.      Clinical Decision Making:  Patient originally described dysuria and urethral burning initially.  However over the last 2 days he is describing white penile discharge at the foreskin and from the urethra.  Today he does not have discharge but is still has irritation and burning at the urethra.  Patient had a negative urinary test and will be screened for Ureaplasma GC and chlamydia.  Patient will monitor the patient portal for test results in real-time.  Patient will be contacted with test results and appropriate treatment as directed by the results.        ICD-10-CM    1. Urinary frequency  R35.0 UA macro with reflex to Microscopic and Culture - Clinc Collect     Ureaplasma species PCR   2. Screen for STD (sexually transmitted disease)  Z11.3 Chlamydia trachomatis/Neisseria gonorrhoeae by PCR - Clinic Collect   3. Penile discharge  R36.9 Chlamydia trachomatis/Neisseria gonorrhoeae by PCR - Clinic Collect       Patient Instructions     Urine test is negative  You have screening test for Ureaplasma and STD screening with gonorrhea and chlamydia.  You will be notified of test results and treatment as indicated by the test.  You may fall in real-time the results in the [facility] patient portal.  Follow up with primary MD for reevaluation and treatment.    Abstain from sexual intercourse until your testing is returned.  Use condoms and barrier method to help prevent future STD exposures.  Follow-up with your primary care provider for reevaluation of your symptoms and interpretation of your labs for STD screening.      Patient Education   What Are Sexually Transmitted Diseases (STDs)?  A sexually transmitted disease (STD) is a disease that is spread during sex. (An STD can also be called STI for sexually transmitted infection.) You can become infected with an STD if you have sex with someone who has an STD. Any sex that  involves the penis, vagina, anus, or mouth can spread disease. Some STDs spread through body fluids such as semen, vaginal fluid, or blood. Others spread through contact with affected skin.  Who is at risk?     Places on or in the body where STDs cause damage include reproductive organs, the rectum, and the mouth.   It doesn t matter if you re straight or kwan, male or female, young or old. Any person who has sex can get an STD. Your risk increases if:    You have more than one partner. The more partners you have, the greater your risk.    Your partner has other partners. If your partner is exposed to an STD, you could be, too.    You or your partner have had sex with other people in the past. Either of you might be carrying an STD from an earlier partner.    You have an STD. The STD may cause sores or other health problems that increase your risk of new infections. Your risk will stay high unless you change the behaviors that put you at risk of the current infection.  Prevent future problems  Left untreated, certain STDs can lead to cancer or even death. Some can harm unborn babies whose mothers are infected. Others can cause you to not be able to have children (sterility) or can affect changes in behavior or your ability to think. You can prevent these problems with safer sex, regular checkups, and early treatment. Always use a latex condom when you have sex. Get tested if you re at risk. And get treated early if you have an STD.  Getting checked  The only sure way to know if you have an STD is to get checked by a healthcare provider. If you notice a change in how your body looks or feels, have it checked out. But keep in mind, STDs don t always show symptoms. So if you re at risk of STDs, get checked regularly. If you find you have an STD, be sure your partner gets treatment, too. If not, his or her health is at risk. And left untreated, your partner could pass the STD back to you, or on to others.  Common  symptoms  Be alert to any changes in your body and your partner s body. Symptoms may appear in or near the vagina, penis, rectum, mouth, or throat. They include:    Unusual discharge    Lumps, bumps, or rashes    Sores that may be painful, itchy, or painless    Itchy skin    Burning with urination    Pain in the pelvis, belly (abdomen), or rectum  Even if you don t have symptoms  You may have an STD, even if you don t have symptoms. If you think you are at risk, get checked. Go to a clinic or to your healthcare provider. If your partner has an STD, you need to be tested too, even if you feel fine.  Vaccines to prevent disease  Vaccines (also called immunizations) are available to prevent hepatitis A and hepatitis B. These are two kinds of STDs. There is also a vaccine to prevent HPV. This is a virus that can be passed from person to person through sexual contact. Ask your healthcare provider whether any of these vaccines is right for you.   Date Last Reviewed: 11/1/2016 2000-2017 [employer]. [address] [place] [address]. All rights reserved. This information is not intended as a substitute for professional medical care. Always follow your healthcare professional's instructions.         Patient Education   Understanding STDs    When it comes to sex, nothing is risk-free. Any sexual contact with the penis, vagina, anus, or mouth can spread a sexually transmitted disease (STD). The only sure way to prevent STDs is abstinence (not having sex). But there are ways to make sex safer. Use a latex condom each time you have sex. And choose your partner wisely.  Use condoms for safer sex  If you have sex, latex condoms provide the best protection against STDs. Latex condoms stop the exchange of body fluids that carry certain STDs. They also limit contact with affected skin. Be aware though, a condom doesn t cover all skin. So, affected skin that is not covered can still transfer disease. But  you re safer with a condom than without one. Use a condom even if you use other birth control. While birth control methods like the pill or IUD help prevent pregnancy, they do not protect against STDs.  Choose the right condom  Condoms made of latex prevent disease best. If you re allergic to latex, use polyurethane condoms instead. Male condoms fit over the penis. Female condoms line the vagina. Before buying a condom, read the label to be sure it prevents disease. Some novelty condoms don t.  The right lubricant helps  Buy lubricated condoms or use lubricant. This provides greater comfort and reduces the risk of condom breakage. Use only water-based lubricants. Don t use oil, lotion, or petroleum jelly. They can weaken the condom, causing breakage. Also, you may want to choose lubricants without nonoxynol-9. It s now known that this spermicide does not prevent disease and may cause irritation.  Use condoms correctly  For condoms to work, they must be used the right way. Keep these tips in mind:    Use a new latex condom each time you have sex. Slip the condom on the penis before any contact is made.    When ready to withdraw, hold the rim of the condom as the penis pulls out. This prevents the condom from slipping off.    Check the expiration date before using a condom.    Don t store condoms in places that can get hot, such as a car or a wallet that is carried in a back pocket.  Get to know your partner  Safer sex is a process. It involves getting to know your partner and making informed choices. Ask each other how many partners you have had in the past, and how many you have now. Find out if either of you has an STD. If you decide to have sex, use a condom each time. Don t stop using condoms unless you re sure neither of you has other partners and you ve both been tested to confirm you don t have STDs. Then stay free of disease by having sex only with each other (monogamy).  Keep your cool  Don t let alcohol or  drugs cloud your judgment. They could lead you to have sex with someone you wouldn t have chosen if you were sober. Or, you might forget to use a condom. If you do plan to have sex, keep a latex condom with you. Don t wait until you re in the heat of passion to try to find one.  Consider abstinence  The only way to be sure you won t get an STD is to abstain from sex. Abstinence is a choice that many people make at some point in their lives. Maybe you want to wait until you are sure you re ready before you have sex. Maybe you d like a break from the responsibilities of sex for a while. Or maybe you just want to know your partner better before taking the next step. Abstinence is a choice you can make now to protect your future.  Date Last Reviewed: 12/1/2016 2000-2017 [employer]. [address] [place] [address]. All rights reserved. This information is not intended as a substitute for professional medical care. Always follow your healthcare professional's instructions.         Patient Education   If You Think You Have an STD  Treating a sexually transmitted disease (STD) early limits the problems they can cause. If you have an STD, get treated right away. Ask your partner to be tested, too. Then avoid sex until you ve finished treatment and your healthcare provider says it s OK to have sex again.    Follow your treatment plan  Treatment depends on the type of STD you have. Common treatments include injections and oral pills or liquids. Creams and gels can be applied to sores caused by certain STDs. Follow the tips below:    Get new treatment for each new STD.    Don t use old medicine, even for the same STD. Use medicines as directed.    Don t share medicine unless instructed to do so by your healthcare provider or clinic.  Talk to your partner  If you have an STD, it s your duty to tell all your recent partners so they can be tested and treated. This is one important way to prevent the  disease from being spread. Telling a partner that you have an STD can be hard. You may be embarrassed, angry, or afraid. It s often unclear who had the STD first. So try not to place blame. Your healthcare provider may offer some advice on how to begin.  Prevent future problems  Even after you ve been treated, you can still be infected again. This is a common problem. It happens when a partner passes the STD back to you. To avoid this, your partner must be tested. He or she may also need treatment. After treatment, go to any scheduled follow-up visits. Then prevent future problems with safer sex. Limit your number of partners and always use a latex condom.  Diagnosing STDS  Your healthcare provider will take a health history and examine you. During your health history, you will be asked about your sex habits and health history. You may also be asked about drug use. Give honest answers. Your healthcare provider will then check your body for signs of STDs. He or she also may perform one or more of the following tests:    Fluid is swabbed from any sores. Samples also may be taken from the vagina, penis, mouth, or rectum. The samples are then tested for STDs.    Blood or urine samples may be taken. They are checked for viruses or bacteria that cause STDs.    For women, cells from the cervix (where the vagina and uterus meet) are checked for signs of cancer. This is called a Pap smear. If cell changes are found, a magnifying scope may be used to take a closer look (colposcopy).   Date Last Reviewed: 12/1/2016 2000-2017 [employer]. [address] [place] [address], [place] [address]. All rights reserved. This information is not intended as a substitute for professional medical care. Always follow your healthcare professional's instructions.         Patient Education   Suspected STI, Culture Only  Your symptoms suggest that you may have a sexually transmitted infection (STI). The most common bacteria that cause  STIs are chlamydia and gonorrhea. Both are highly contagious. They are passed by sexual contact with an infected partner.  Symptoms begin within 1 to 3 weeks after exposure. There is usually a discharge from the penis or vagina and burning during urination. Many women with one of these infections will have only mild symptoms or no symptoms at all early in the disease.  Culture tests have been taken. These will show if you have an infection with chlamydia or gonorrhea. These infections can be treated and cured with antibiotic medicine.  Home care  Do not have sex until you know that your test result is negative.  If a culture was done, call for the results. If the culture is positive, contact your healthcare provider, local clinic, or local public health department to be treated, or return to our facility.    You will be prescribed antibiotic medicine. Be sure to take all of the antibiotic as prescribed until it is gone or you are told to stop. Keep taking it even if you feel better.    Both you and your sexual partner or partners need to be treated, even if the partner has no symptoms.    Do not have sex until both you and your partner or partners have finished all antibiotic medicine and you are told that you are no longer contagious.  Learn about  safe sex  practices and use these in the future. The safest sex is with a partner who has tested negative for STIs and only has sex with you. Condoms can help prevent the spread of gonorrhea and chlamydia, but are not a guarantee.  Follow-up care  Follow up with your healthcare provider or as advised by our staff. Call as directed for the results of your culture. If your culture test is positive and you are treated with an antibiotic, you should have another culture taken 4 to 6 weeks after treatment. This is to be sure the infection has cleared. Follow up with your provider or the public health department for complete STI screening, including HIV testing. For more  information about STIs, call the CDC information line at 162-540-4915.  When to seek medical advice  Call your healthcare provider if any of these occur:    Fever of 100.4 F (38.0 C) or higher, or as directed    New pain in your lower belly (abdomen) or back or pain that gets worse    Unexpected vaginal bleeding    Weakness, dizziness, or fainting    Repeated vomiting    Inability to urinate because of pain    Rash or joint pain    Painful open sores on the penis or around the outer vagina    Enlarged painful lumps (lymph nodes) in the groin    Testicle pain or scrotal swelling in men  Date Last Reviewed: 9/25/2015 2000-2017 [employer]. [address] [place], [place] [address]. All rights reserved. This information is not intended as a substitute for professional medical care. Always follow your healthcare professional's instructions.              HPI:  Zack Nelson is a 41 year old male who presents today for a 5-day history of intermittent dysuria and irritation.  Patient is describing that he has some irritation at the urethra in the shower 5 days ago.  Over the last 2 days he also describes some penile discharge that has been clear to whitish.  He is  and has 1 sexual partner neither him nor the sexual partner are reporting any STDs other than the symptoms that Zack is describing today.  Patient has not had gross hematuria fever chills night sweats myalgias arthralgias or joint effusions no noted skin rash.    History obtained from chart review and the patient.    Problem List:  2019-12: Moderate persistent asthma without complication  2019-12: Idiopathic chronic gout of multiple sites without tophus      Past Medical History:   Diagnosis Date     Uncomplicated asthma        Social History     Tobacco Use     Smoking status: Never Smoker     Smokeless tobacco: Never Used   Substance Use Topics     Alcohol use: Not Currently     Alcohol/week: 2.0 standard drinks       Review of  Systems  As above in HPI otherwise negative.    Vitals:    08/24/22 1625   BP: 139/88   BP Location: Left arm   Patient Position: Sitting   Cuff Size: Adult Regular   Pulse: 66   Resp: 16   Temp: 98.5  F (36.9  C)   TempSrc: Oral   SpO2: 97%   Weight: 93.4 kg (206 lb)       General: Patient is resting comfortably no acute distress is afebrile  HEENT: Head is normocephalic atraumatic   Skin: Without rash non-diaphoretic  : Testicles are descended bilaterally there is no lesions on the scrotum.  There is uncircumcised penis.  The foreskin is inspected and is not erythematous or having lesions.  Foreskin is retracted there is no irritation or redness around the urethra or the glans penis no noted discharge from the urethra the shaft of the penis did not have lesions or redness noted.  No inguinal lymphadenopathy appreciated.    Physical Exam      Labs:  Results for orders placed or performed in visit on 08/24/22   UA macro with reflex to Microscopic and Culture - Clinc Collect     Status: Normal    Specimen: Urine, Clean Catch   Result Value Ref Range    Color Urine Yellow Colorless, Straw, Light Yellow, Yellow    Appearance Urine Clear Clear    Glucose Urine Negative Negative mg/dL    Bilirubin Urine Negative Negative    Ketones Urine Negative Negative mg/dL    Specific Gravity Urine 1.020 1.005 - 1.030    Blood Urine Negative Negative    pH Urine 6.0 5.0 - 8.0    Protein Albumin Urine Negative Negative mg/dL    Urobilinogen Urine 0.2 0.2, 1.0 E.U./dL    Nitrite Urine Negative Negative    Leukocyte Esterase Urine Negative Negative    Narrative    Microscopic not indicated     Ureaplasma and GC and Chlamydia are pending at time of documentation    At the end of the encounter, I discussed results, diagnosis, medications. Discussed red flags for immediate return to clinic/ER, as well as indications for follow up if no improvement. Patient understood and agreed to plan. Patient was stable for discharge.

## 2022-08-25 LAB
C TRACH DNA SPEC QL PROBE+SIG AMP: NEGATIVE
N GONORRHOEA DNA SPEC QL NAA+PROBE: NEGATIVE

## 2022-08-29 LAB
SPECIMEN SOURCE: NORMAL
U PARVUM DNA SPEC QL NAA+PROBE: NEGATIVE
U UREALYTICUM DNA SPEC QL NAA+PROBE: NEGATIVE

## 2022-08-30 ENCOUNTER — MYC MEDICAL ADVICE (OUTPATIENT)
Dept: FAMILY MEDICINE | Facility: CLINIC | Age: 42
End: 2022-08-30

## 2022-08-30 DIAGNOSIS — G47.33 OSA (OBSTRUCTIVE SLEEP APNEA): Primary | ICD-10-CM

## 2022-09-22 ASSESSMENT — ASTHMA QUESTIONNAIRES
QUESTION_2 LAST FOUR WEEKS HOW OFTEN HAVE YOU HAD SHORTNESS OF BREATH: NOT AT ALL
QUESTION_3 LAST FOUR WEEKS HOW OFTEN DID YOUR ASTHMA SYMPTOMS (WHEEZING, COUGHING, SHORTNESS OF BREATH, CHEST TIGHTNESS OR PAIN) WAKE YOU UP AT NIGHT OR EARLIER THAN USUAL IN THE MORNING: ONCE OR TWICE
QUESTION_5 LAST FOUR WEEKS HOW WOULD YOU RATE YOUR ASTHMA CONTROL: WELL CONTROLLED
QUESTION_4 LAST FOUR WEEKS HOW OFTEN HAVE YOU USED YOUR RESCUE INHALER OR NEBULIZER MEDICATION (SUCH AS ALBUTEROL): TWO OR THREE TIMES PER WEEK
ACT_TOTALSCORE: 21
QUESTION_1 LAST FOUR WEEKS HOW MUCH OF THE TIME DID YOUR ASTHMA KEEP YOU FROM GETTING AS MUCH DONE AT WORK, SCHOOL OR AT HOME: NONE OF THE TIME
ACT_TOTALSCORE: 21

## 2022-09-23 ENCOUNTER — OFFICE VISIT (OUTPATIENT)
Dept: INTERNAL MEDICINE | Facility: CLINIC | Age: 42
End: 2022-09-23
Payer: COMMERCIAL

## 2022-09-23 VITALS
HEART RATE: 70 BPM | SYSTOLIC BLOOD PRESSURE: 110 MMHG | DIASTOLIC BLOOD PRESSURE: 76 MMHG | OXYGEN SATURATION: 98 % | BODY MASS INDEX: 31.67 KG/M2 | WEIGHT: 209 LBS | HEIGHT: 68 IN

## 2022-09-23 DIAGNOSIS — R10.30 INGUINAL PAIN, UNSPECIFIED LATERALITY: Primary | ICD-10-CM

## 2022-09-23 DIAGNOSIS — Z23 INFLUENZA VACCINATION ADMINISTERED AT CURRENT VISIT: ICD-10-CM

## 2022-09-23 DIAGNOSIS — E79.0 ELEVATED URIC ACID IN BLOOD: ICD-10-CM

## 2022-09-23 DIAGNOSIS — M1A.9XX0 CHRONIC GOUT INVOLVING TOE WITHOUT TOPHUS, UNSPECIFIED CAUSE, UNSPECIFIED LATERALITY: ICD-10-CM

## 2022-09-23 LAB
ALBUMIN UR-MCNC: NEGATIVE MG/DL
APPEARANCE UR: CLEAR
BILIRUB UR QL STRIP: NEGATIVE
COLOR UR AUTO: YELLOW
GLUCOSE UR STRIP-MCNC: NEGATIVE MG/DL
HGB UR QL STRIP: NEGATIVE
KETONES UR STRIP-MCNC: NEGATIVE MG/DL
LEUKOCYTE ESTERASE UR QL STRIP: NEGATIVE
NITRATE UR QL: NEGATIVE
PH UR STRIP: 6 [PH] (ref 5–8)
RBC #/AREA URNS AUTO: NORMAL /HPF
SP GR UR STRIP: 1.02 (ref 1–1.03)
UROBILINOGEN UR STRIP-ACNC: 0.2 E.U./DL
WBC #/AREA URNS AUTO: NORMAL /HPF

## 2022-09-23 PROCEDURE — 90471 IMMUNIZATION ADMIN: CPT | Performed by: INTERNAL MEDICINE

## 2022-09-23 PROCEDURE — 81001 URINALYSIS AUTO W/SCOPE: CPT | Performed by: INTERNAL MEDICINE

## 2022-09-23 PROCEDURE — 99214 OFFICE O/P EST MOD 30 MIN: CPT | Mod: 25 | Performed by: INTERNAL MEDICINE

## 2022-09-23 PROCEDURE — 90686 IIV4 VACC NO PRSV 0.5 ML IM: CPT | Performed by: INTERNAL MEDICINE

## 2022-09-23 NOTE — PROGRESS NOTES
Office Visit - Follow Up   Zack Nelson   41 year old male    Date of Visit: 9/23/2022    Chief Complaint   Patient presents with     Testicular/scrotal Pain     Mild/dull pain  x 1 month, no rash or lumps, something just doesn't feel right        -------------------------------------------------------------------------------------------------------------------------  Assessment and Plan    Symptom directed visit    Vague groin and lower abdominal pain, which has been going on for about 1 month, with bland urine studies done in the urgent care August 24, 2022  Consider possibility of kidney stones, since he has history of elevated uric acid and gets gout symptoms maybe once a year    Zack is historically a pretty healthy ivanna, who works as a respiratory therapist over at the [employer].  He recalls getting gout about once a year, typically in his feet, and had an elevated uric acid of 8.8 measured June 2022.  However, he has no history of documented kidney stones, and I did not see any historical imaging of his abdomen.    He has had this pain for about a month, and it is a vague sort of aching, that tends to occur in the lower abdomen and into the scrotum, not really localizing to either side.  He might also get some dull low back pain.  He has not noticed any change in the appearance of his urine, nor any pain with urination.  He was evaluated in the urgent care on August 24 because he noticed some urethral discharge.  They investigated him thoroughly with a urinalysis which was totally normal, and also PCR testing for chlamydia and Ureaplasma which were negative.  Zack does not think he has any risk factors for an STD.    Today September 23, 2022, his physical exam is unrevealing.  I do not find any flank tenderness to percussion.  His abdomen is nontender.  Scrotum contents palpably normal, no physical exam findings to support inguinal hernias.    Will sent him to the laboratory for another  urinalysis.  Even if that comes back totally normal, will checking for kidney stones.  We will get a noncontrast CT scan of the abdomen, stone protocol.    If that is unrevealing, then my next step might be to have him undergo a scrotum ultrasound, even though I do not feel any palpable abnormalities.    Gout, tend to be once a year in his feet.  Zack has not identified any dietary triggers.  He tries to eat reasonably, and does not consume excessive alcohol.  If Turns out to be a recurring problem, might consider going on allopurinol.  If he is found to have uric acid kidney stones, then really I would want him to go on allopurinol.    He has an active prescription for colchicine tablets, estimates he has about 10 pills on hand.  When he gets a gout flareup in his feet, he takes colchicine for maybe 3 days and it works    Asthma, for which he is on maintenance Symbicort, takes consistently, and he thinks asthma is reasonably well controlled    Poor quality sleep, fatigue, he is going to be having another sleep study done soon to look for possibility of sleep apnea    Allergic rhinitis, under control    Bit overweight with obese level body mass index 31.6, long-term project for him.    He is willing to get his seasonal flu shot today September 23, 2022    Immunization History   Administered Date(s) Administered     COVID-19SIMEON,Pfizer (12+ Yrs) 12/21/2020, 01/07/2021, 10/08/2021     Influenza Vaccine IM > 6 months Valent IIV4 (Alfuria,Fluzone) 10/14/2019, 09/30/2021         --------------------------------------------------------------------------------------------------------------------------  History of Present Illness  This 41 year old old     Reason for visit:  Testicular discomfort  Symptom onset:  3-4 weeks ago  Symptoms include:  Testicular discomfort  Symptom intensity:  Mild  Symptom progression:  Staying the same  Had these symptoms before:  No  What makes it worse:  Walking  What makes it better:   Laying down      Wt Readings from Last 3 Encounters:   09/23/22 94.8 kg (209 lb)   08/24/22 93.4 kg (206 lb)   06/22/22 94.4 kg (208 lb 3.2 oz)     BP Readings from Last 3 Encounters:   09/23/22 110/76   08/24/22 139/88   06/22/22 108/68       Lab Results   Component Value Date    CHOL 212 (H) 06/24/2022    TRIG 109 06/24/2022    HDL 48 06/24/2022    ALT 27 06/24/2022    AST 19 06/24/2022     06/24/2022    BUN 17 06/24/2022    CO2 29 06/24/2022        Review of Systems: A comprehensive review of systems was negative except as noted.  ---------------------------------------------------------------------------------------------------------------------------    Medications, Allergies, Social, and Problem List   Current Outpatient Medications   Medication Sig Dispense Refill     albuterol (PROAIR HFA/PROVENTIL HFA/VENTOLIN HFA) 108 (90 Base) MCG/ACT inhaler Inhale 2 puffs into the lungs every 6 hours as needed 36 g 3     albuterol (PROVENTIL) (2.5 MG/3ML) 0.083% neb solution Take 1 vial (2.5 mg) by nebulization every 6 hours as needed for shortness of breath / dyspnea or wheezing 90 mL 3     azelastine (ASTEPRO) 0.15 % (205.5 mcg) Spry nasal spray [AZELASTINE (ASTEPRO) 0.15 % (205.5 MCG) SPRY NASAL SPRAY] Apply 2 sprays into each nostril daily as needed. 30 mL 3     colchicine (MITIGARE) 0.6 MG capsule Take 1 capsule (0.6 mg) by mouth daily as needed (gout flare) 30 capsule 1     ipratropium-albuterol (COMBIVENT RESPIMAT)  MCG/ACT inhaler Inhale 1 puff into the lungs 4 times daily as needed 1 each 3     No Known Allergies  Social History     Tobacco Use     Smoking status: Never Smoker     Smokeless tobacco: Never Used   Vaping Use     Vaping Use: Never used   Substance Use Topics     Alcohol use: Not Currently     Alcohol/week: 2.0 standard drinks     Drug use: Never     Patient Active Problem List   Diagnosis     Moderate persistent asthma without complication     Idiopathic chronic gout of multiple sites  "without tophus        Reviewed, reconciled and updated       Physical Exam   General Appearance:       /76 (BP Location: Right arm, Patient Position: Sitting, Cuff Size: Adult Large)   Pulse 70   Ht 1.734 m (5' 8.25\")   Wt 94.8 kg (209 lb)   SpO2 98%   BMI 31.55 kg/m      Today September 23, 2022, his physical exam is unrevealing.  I do not find any flank tenderness to percussion.  His abdomen is nontender.  Scrotum contents palpably normal, no physical exam findings to support inguinal hernias.     Additional Information        HECTOR MAGANA MD, MD      "

## 2022-09-23 NOTE — PATIENT INSTRUCTIONS
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  He recalls getting gout about once a year, typically in his feet, and had an elevated uric acid of 8.8 measured June 2022.  However, he has no history of documented kidney stones, and I did not see any historical imaging of his abdomen.    He has had this pain for about a month, and it is a vague sort of aching, that tends to occur in the lower abdomen and into the scrotum, not really localizing to either side.  He might also get some dull low back pain.  He has not noticed any change in the appearance of his urine, nor any pain with urination.  He was evaluated in the urgent care on August 24 because he noticed some urethral discharge.  They investigated him thoroughly with a urinalysis which was totally normal, and also PCR testing for chlamydia and Ureaplasma which were negative.  Zack does not think he has any risk factors for an STD.    Today September 23, 2022, his physical exam is unrevealing.  I do not find any flank tenderness to percussion.  His abdomen is nontender.  Scrotum contents palpably normal, no physical exam findings to support inguinal hernias.    Will sent him to the laboratory for another urinalysis.  Even if that comes back totally normal, will checking for kidney stones.  We will get a noncontrast CT scan of the abdomen, stone protocol.    If that is unrevealing, then my next step might be to have him undergo a scrotum ultrasound, even though I do not feel any palpable abnormalities.    Gout, tend to be once a year in his feet.  Zack has not identified any dietary triggers.  He  tries to eat reasonably, and does not consume excessive alcohol.  If Turns out to be a recurring problem, might consider going on allopurinol.  If he is found to have uric acid kidney stones, then really I would want him to go on allopurinol.    He has an active prescription for colchicine tablets, estimates he has about 10 pills on hand.  When he gets a gout flareup in his feet, he takes colchicine for maybe 3 days and it works    Asthma, for which he is on maintenance Symbicort, takes consistently, and he thinks asthma is reasonably well controlled    Poor quality sleep, fatigue, he is going to be having another sleep study done soon to look for possibility of sleep apnea    Allergic rhinitis, under control    Bit overweight with obese level body mass index 31.6, long-term project for him.    He is willing to get his seasonal flu shot today September 23, 2022    Immunization History   Administered Date(s) Administered    COVID-SIMEON [place],Pfizer (12+ Yrs) 12/21/2020, 01/07/2021, 10/08/2021    Influenza Vaccine IM > 6 months Valent IIV4 (Alfuria,Fluzone) 10/14/2019, 09/30/2021

## 2023-01-12 ENCOUNTER — MYC MEDICAL ADVICE (OUTPATIENT)
Dept: FAMILY MEDICINE | Facility: CLINIC | Age: 43
End: 2023-01-12
Payer: COMMERCIAL

## 2023-01-12 DIAGNOSIS — M1A.09X0 IDIOPATHIC CHRONIC GOUT OF MULTIPLE SITES WITHOUT TOPHUS: Primary | ICD-10-CM

## 2023-01-12 RX ORDER — PREDNISONE 20 MG/1
40 TABLET ORAL DAILY
Qty: 10 TABLET | Refills: 0 | Status: SHIPPED | OUTPATIENT
Start: 2023-01-12 | End: 2023-01-17

## 2023-01-16 NOTE — TELEPHONE ENCOUNTER
See MyChart from Patient needing PCP review.  Please respond directly to patient, if at all able.    Please advise if you would like patient seen d/t patient still experiencing pain after completion of sterdomingo Saravia RN  [facility]

## 2023-01-16 NOTE — TELEPHONE ENCOUNTER
I think he should be seen in person if prednisone did not work for him. I would recommend [facility] care or could consider ortho quick care at [place] or similar location. May need xray of area so virtual would not be ideal.

## 2023-01-17 ENCOUNTER — OFFICE VISIT (OUTPATIENT)
Dept: FAMILY MEDICINE | Facility: CLINIC | Age: 43
End: 2023-01-17
Payer: COMMERCIAL

## 2023-01-17 VITALS
HEART RATE: 66 BPM | HEIGHT: 68 IN | DIASTOLIC BLOOD PRESSURE: 82 MMHG | BODY MASS INDEX: 32.74 KG/M2 | RESPIRATION RATE: 16 BRPM | WEIGHT: 216 LBS | SYSTOLIC BLOOD PRESSURE: 118 MMHG | OXYGEN SATURATION: 97 % | TEMPERATURE: 99.5 F

## 2023-01-17 DIAGNOSIS — M1A.09X0 IDIOPATHIC CHRONIC GOUT OF MULTIPLE SITES WITHOUT TOPHUS: ICD-10-CM

## 2023-01-17 DIAGNOSIS — J45.40 MODERATE PERSISTENT ASTHMA WITHOUT COMPLICATION: ICD-10-CM

## 2023-01-17 PROCEDURE — 99214 OFFICE O/P EST MOD 30 MIN: CPT | Performed by: FAMILY MEDICINE

## 2023-01-17 RX ORDER — PREDNISONE 20 MG/1
TABLET ORAL
Qty: 10 TABLET | Refills: 0 | Status: SHIPPED | OUTPATIENT
Start: 2023-01-17 | End: 2023-02-01

## 2023-01-17 RX ORDER — ALBUTEROL SULFATE 90 UG/1
2 AEROSOL, METERED RESPIRATORY (INHALATION) EVERY 6 HOURS PRN
Qty: 36 G | Refills: 3 | Status: SHIPPED | OUTPATIENT
Start: 2023-01-17 | End: 2023-11-27

## 2023-01-17 ASSESSMENT — PAIN SCALES - GENERAL: PAINLEVEL: MODERATE PAIN (5)

## 2023-01-17 NOTE — LETTER
[facility]  [address] [address] [address]  Phone: [phone]    January 17, 2023        Zack Nelson  [address] [address]          To whom it may concern:    RE: Zack Nelson    Patient was seen and treated today at our clinic and is ok to go back to work on Tuesday 1/24/2023.    Please contact me for questions or concerns.      Sincerely,        Pam Zaidi MD

## 2023-01-17 NOTE — PROGRESS NOTES
"  Assessment & Plan     Moderate persistent asthma without complication  Controlled, continue on albuterol as needed.  - albuterol (PROAIR HFA/PROVENTIL HFA/VENTOLIN HFA) 108 (90 Base) MCG/ACT inhaler; Inhale 2 puffs into the lungs every 6 hours as needed    Idiopathic chronic gout of multiple sites without tophus  Sever, continue on prednisone 40 mg for 5 days then taper down (total 10 days for 40 mg), meanwhile, may increase colchecine to twice daily for 3 days then cut down to once daily after that, pt may stop colchecine after resolution of the symptoms.   - predniSONE (DELTASONE) 20 MG tablet; Take 2 tablets (40 mg) by mouth daily for 5 days, THEN 1 tablet (20 mg) daily for 5 days, THEN 0.5 tablets (10 mg) daily for 5 days.             BMI:   Estimated body mass index is 32.6 kg/m  as calculated from the following:    Height as of this encounter: 1.734 m (5' 8.25\").    Weight as of this encounter: 98 kg (216 lb).   Weight management plan: Discussed healthy diet and exercise guidelines        No follow-ups on file.   Follow-up Visit   Expected date:  Jan 31, 2023 (Approximate)      Follow Up Appointment Details:     Follow-up with whom?: Me    Follow-Up for what?: Acute Issue Recheck    How?: In Person    Is this an as-needed follow-up?: Yes                    Pam Zaidi MD  [facility]    Misa Dixon is a 42 year old, presenting for the following health issues:  Arthritis      Arthritis    History of Present Illness       Reason for visit:  Gout    He eats 0-1 servings of fruits and vegetables daily.He exercises with enough effort to increase his heart rate 30 to 60 minutes per day.  He exercises with enough effort to increase his heart rate 3 or less days per week.   He is taking medications regularly.       Gout/ Single Inflamed Joint  Onset/Duration: x 2 weeks  Description:   Location: big toe - left  Joint Swelling: YES  Redness: YES  Pain: YES  Intensity: 7/10 now pain is " Component      Latest Ref Rng & Units 6/24/2020   Sodium      135 - 145 mmol/L 138   Potassium      3.4 - 5.1 mmol/L 3.4   Chloride      98 - 107 mmol/L 104   CO2      21 - 32 mmol/L 27   ANION GAP      10 - 20 mmol/L 10   Glucose      65 - 99 mg/dL 85   BUN      6 - 20 mg/dL 20   Creatinine      0.51 - 0.95 mg/dL 0.93   Glomerular Filtration Rate      >90 66 (L)   BUN/CREATININE RATIO      7 - 25 22   CALCIUM      8.4 - 10.2 mg/dL 8.7     Component      Latest Ref Rng & Units 6/24/2020   PROTEIN, URINE (TOTAL)      <12 mg/dL <6   CREATININE, URINE (TOTAL)      mg/dL 15.70   PROTEIN/CREATININE RATIO          Urine protein below limits of detection    Decrease prednisone to 20 mg daily    Await late July labs   4-5/10  Progression of Symptoms: same  Accompanying Signs & Symptoms:  Fevers: No  History:   Trauma to the area: No  Previous history of gout: YES  Recent illness: No  Alcohol use: No  Diuretic use: No  Precipitating or alleviating factors: None  Therapies tried and outcome: ibuprofen 800 mg tid  He is worried as his symptoms are longer than the usual, and it has been on and off, and now it is causing him pain in the left leg.  Pt called his PCP and was prescribed prednisone for 5 days and it was stopped yesterday, it did help but he feels the pain is worse.  When he put his foot up, it is better.          Review of Systems   Musculoskeletal: Positive for arthritis.            Objective    There were no vitals taken for this visit.  There is no height or weight on file to calculate BMI.  Physical Exam   Foot exam : erythema of the MC joint of the big toe of the left foot, with tenderness, slightly painful ROM of the toe, but movable, pedal pulses is normal.  Normal exam of the ankle and knee. No swelling.

## 2023-01-23 ENCOUNTER — MYC MEDICAL ADVICE (OUTPATIENT)
Dept: FAMILY MEDICINE | Facility: CLINIC | Age: 43
End: 2023-01-23
Payer: COMMERCIAL

## 2023-01-23 DIAGNOSIS — M1A.09X0 IDIOPATHIC CHRONIC GOUT OF MULTIPLE SITES WITHOUT TOPHUS: Primary | ICD-10-CM

## 2023-01-23 DIAGNOSIS — M79.673 PAIN OF FOOT, UNSPECIFIED LATERALITY: ICD-10-CM

## 2023-01-23 NOTE — TELEPHONE ENCOUNTER
See MyChart from Patient needing PCP review.  Please respond directly to patient, if at all able.    LANCE Saravia  [facility]

## 2023-01-23 NOTE — TELEPHONE ENCOUNTER
Dr. Zaidi-     [facility]? Follow-up with PCP?  -Patient seen 1/17/23, reports gout symptoms have not improved.   -Patient would like recommendation on if he should take allopurinol.   - Currently taking Colchicine 0.6mg daily, Prednisone 20 mg daily    Per visit note 1/17/23:  Idiopathic chronic gout of multiple sites without tophus  Sever, continue on prednisone 40 mg for 5 days then taper down (total 10 days for 40 mg), meanwhile, may increase colchecine to twice daily for 3 days then cut down to once daily after that, pt may stop colchecine after resolution of the symptoms.   - predniSONE (DELTASONE) 20 MG tablet; Take 2 tablets (40 mg) by mouth daily for 5 days, THEN 1 tablet (20 mg) daily for 5 days, THEN 0.5 tablets (10 mg) daily for 5 days.    Prudence BARAJAS RN, BSN, PHN  [facility]

## 2023-01-23 NOTE — TELEPHONE ENCOUNTER
See MyChart from patient needing PCP review.    Please respond directly to patient if at all able.    Yusra Terry RN  [facility]

## 2023-01-23 NOTE — TELEPHONE ENCOUNTER
Dr. Zaidi-    Ellis barajas. Patient reports he was seen at [facility] in on 1/20/23. Patient requesting short term disability form completed. Virtual visit needed to complete form? Any additional recommendations for patient?     Prudence BARAJAS RN, BSN, PHN  [facility]

## 2023-01-23 NOTE — TELEPHONE ENCOUNTER
1-23-23  Pt called & attached the form in a my-chart msg.  Dr Lord doesn't have any openings till May, pt needs this form/virtual appt ASAP.  Pt is scheduled on 1-24-23 for labs but cant get in with Risa welch

## 2023-01-23 NOTE — TELEPHONE ENCOUNTER
Sending to PCP for review.  Please review and advise on patient MyChart message.    No available office visits - please advise is you would work patient into schedule for in person or virtual visit to complete form.     Juan Manuel Victor RN  [facility]

## 2023-01-23 NOTE — TELEPHONE ENCOUNTER
I really don't know what to do next! I wonder if Ortho can see thie patient to find out the reason behind this pain?  I put a referral in for podiatry.

## 2023-01-23 NOTE — TELEPHONE ENCOUNTER
Responded to [place] to inform patient, scheduling number provided.     Prudence BARAJAS RN, BSN, PHN  [facility]

## 2023-01-23 NOTE — TELEPHONE ENCOUNTER
See MyChart from Patient needing PCP review.  Please respond directly to patient, if at all able.    LANCE Saravia  [facility]

## 2023-01-24 ENCOUNTER — TELEPHONE (OUTPATIENT)
Dept: FAMILY MEDICINE | Facility: CLINIC | Age: 43
End: 2023-01-24

## 2023-01-24 ENCOUNTER — LAB (OUTPATIENT)
Dept: LAB | Facility: CLINIC | Age: 43
End: 2023-01-24
Payer: COMMERCIAL

## 2023-01-24 ENCOUNTER — OFFICE VISIT (OUTPATIENT)
Dept: FAMILY MEDICINE | Facility: CLINIC | Age: 43
End: 2023-01-24

## 2023-01-24 ENCOUNTER — APPOINTMENT (OUTPATIENT)
Dept: LAB | Facility: CLINIC | Age: 43
End: 2023-01-24
Payer: COMMERCIAL

## 2023-01-24 DIAGNOSIS — M1A.09X0 IDIOPATHIC CHRONIC GOUT OF MULTIPLE SITES WITHOUT TOPHUS: ICD-10-CM

## 2023-01-24 DIAGNOSIS — Z13.1 SCREENING FOR DIABETES MELLITUS: Primary | ICD-10-CM

## 2023-01-24 DIAGNOSIS — M10.9 ACUTE GOUT INVOLVING TOE OF LEFT FOOT, UNSPECIFIED CAUSE: ICD-10-CM

## 2023-01-24 LAB
ALBUMIN SERPL BCG-MCNC: 4.6 G/DL (ref 3.5–5.2)
ALP SERPL-CCNC: 67 U/L (ref 40–129)
ALT SERPL W P-5'-P-CCNC: 31 U/L (ref 10–50)
ANION GAP SERPL CALCULATED.3IONS-SCNC: 14 MMOL/L (ref 7–15)
AST SERPL W P-5'-P-CCNC: 21 U/L (ref 10–50)
BASOPHILS # BLD AUTO: 0.1 10E3/UL (ref 0–0.2)
BASOPHILS NFR BLD AUTO: 0 %
BILIRUB SERPL-MCNC: 0.6 MG/DL
BUN SERPL-MCNC: 18.8 MG/DL (ref 6–20)
CALCIUM SERPL-MCNC: 9.7 MG/DL (ref 8.6–10)
CHLORIDE SERPL-SCNC: 102 MMOL/L (ref 98–107)
CREAT SERPL-MCNC: 1.15 MG/DL (ref 0.67–1.17)
CRP SERPL-MCNC: <3 MG/L
DEPRECATED HCO3 PLAS-SCNC: 25 MMOL/L (ref 22–29)
EOSINOPHIL # BLD AUTO: 0.1 10E3/UL (ref 0–0.7)
EOSINOPHIL NFR BLD AUTO: 1 %
ERYTHROCYTE [DISTWIDTH] IN BLOOD BY AUTOMATED COUNT: 12.7 % (ref 10–15)
ERYTHROCYTE [SEDIMENTATION RATE] IN BLOOD BY WESTERGREN METHOD: 11 MM/HR (ref 0–15)
GFR SERPL CREATININE-BSD FRML MDRD: 82 ML/MIN/1.73M2
GLUCOSE SERPL-MCNC: 90 MG/DL (ref 70–99)
HCT VFR BLD AUTO: 50.5 % (ref 40–53)
HGB BLD-MCNC: 16.6 G/DL (ref 13.3–17.7)
IMM GRANULOCYTES # BLD: 0.1 10E3/UL
IMM GRANULOCYTES NFR BLD: 0 %
LYMPHOCYTES # BLD AUTO: 2.6 10E3/UL (ref 0.8–5.3)
LYMPHOCYTES NFR BLD AUTO: 20 %
MCH RBC QN AUTO: 29.9 PG (ref 26.5–33)
MCHC RBC AUTO-ENTMCNC: 32.9 G/DL (ref 31.5–36.5)
MCV RBC AUTO: 91 FL (ref 78–100)
MONOCYTES # BLD AUTO: 0.8 10E3/UL (ref 0–1.3)
MONOCYTES NFR BLD AUTO: 6 %
NEUTROPHILS # BLD AUTO: 9.3 10E3/UL (ref 1.6–8.3)
NEUTROPHILS NFR BLD AUTO: 72 %
PLATELET # BLD AUTO: 358 10E3/UL (ref 150–450)
POTASSIUM SERPL-SCNC: 3.9 MMOL/L (ref 3.4–5.3)
PROT SERPL-MCNC: 7.1 G/DL (ref 6.4–8.3)
RBC # BLD AUTO: 5.56 10E6/UL (ref 4.4–5.9)
SODIUM SERPL-SCNC: 141 MMOL/L (ref 136–145)
URATE SERPL-MCNC: 7.5 MG/DL (ref 3.4–7)
WBC # BLD AUTO: 13 10E3/UL (ref 4–11)

## 2023-01-24 PROCEDURE — 99213 OFFICE O/P EST LOW 20 MIN: CPT | Mod: TEL | Performed by: INTERNAL MEDICINE

## 2023-01-24 PROCEDURE — 84550 ASSAY OF BLOOD/URIC ACID: CPT

## 2023-01-24 PROCEDURE — 83036 HEMOGLOBIN GLYCOSYLATED A1C: CPT

## 2023-01-24 PROCEDURE — 85025 COMPLETE CBC W/AUTO DIFF WBC: CPT

## 2023-01-24 PROCEDURE — 86140 C-REACTIVE PROTEIN: CPT

## 2023-01-24 PROCEDURE — 36415 COLL VENOUS BLD VENIPUNCTURE: CPT

## 2023-01-24 PROCEDURE — 80053 COMPREHEN METABOLIC PANEL: CPT

## 2023-01-24 PROCEDURE — 85652 RBC SED RATE AUTOMATED: CPT

## 2023-01-24 RX ORDER — OXYCODONE AND ACETAMINOPHEN 5; 325 MG/1; MG/1
1 TABLET ORAL
Qty: 12 TABLET | Refills: 0 | Status: SHIPPED | OUTPATIENT
Start: 2023-01-24 | End: 2023-01-27

## 2023-01-24 NOTE — TELEPHONE ENCOUNTER
Form in [employer] incoming desk basket. Please call patient once form is ready to .    Thank you.

## 2023-01-24 NOTE — TELEPHONE ENCOUNTER
Called Patient to informed him the paperwork is completed and ready to . Patient expressed understating and will stop by tomorrow.

## 2023-01-24 NOTE — PROGRESS NOTES
Assessment & Plan     Screening for diabetes mellitus    Acute gout involving toe of left foot, unspecified cause  Will give percocet for use at night if needed as not having benefit. [facility], may consider anakinra if needed for ongoing symptoms if needed. Labs today to check for other causes. Patient will send photo. FMLA forms filled out for patient.  - oxyCODONE-acetaminophen (PERCOCET) 5-325 MG tablet; Take 1 tablet by mouth nightly as needed for breakthrough pain, pain or moderate to severe pain                 No follow-ups on file.    Wily Lord MD  [facility]    Misa Dixon is a 42 year old, presenting for the following health issues:  No chief complaint on file.      HPI     Patient has been having ongoing pain on the left side.     Has seen [facility] x 2.     Has been drinking a lot for fluids.    Has changed diet-     Has not been able to work during this.     Cannot put shoe on due to pain.     Picture of area seems to be the same- can ice with some help but challenging to move with swelling. Pain right in the joint area. Foot swollen on the side- Left great toe- very painful along the side of the toe- painful down to the pad of the foot.     \  Review of Systems   Constitutional, HEENT, cardiovascular, pulmonary, gi and gu systems are negative, except as otherwise noted.      Objective    There were no vitals taken for this visit.  There is no height or weight on file to calculate BMI.  Physical Exam   Telephone visit, voice fluent, no dyspnea noted    A total of 21 minutes spent during encounter

## 2023-01-25 ENCOUNTER — MYC MEDICAL ADVICE (OUTPATIENT)
Dept: FAMILY MEDICINE | Facility: CLINIC | Age: 43
End: 2023-01-25
Payer: COMMERCIAL

## 2023-01-25 DIAGNOSIS — Z13.1 SCREENING FOR DIABETES MELLITUS: Primary | ICD-10-CM

## 2023-01-25 NOTE — TELEPHONE ENCOUNTER
See MyChart from patient needing PCP review. Pt sent pictures of their feet for review, as noted in OV note.    Please respond directly to patient if at all able.    Yusra Terry RN  [facility]

## 2023-01-26 ENCOUNTER — TRANSFERRED RECORDS (OUTPATIENT)
Dept: HEALTH INFORMATION MANAGEMENT | Facility: CLINIC | Age: 43
End: 2023-01-26

## 2023-01-26 ENCOUNTER — MYC MEDICAL ADVICE (OUTPATIENT)
Dept: FAMILY MEDICINE | Facility: CLINIC | Age: 43
End: 2023-01-26
Payer: COMMERCIAL

## 2023-01-26 DIAGNOSIS — M10.9 ACUTE GOUT INVOLVING TOE OF LEFT FOOT, UNSPECIFIED CAUSE: ICD-10-CM

## 2023-01-26 DIAGNOSIS — Z13.1 SCREENING FOR DIABETES MELLITUS: ICD-10-CM

## 2023-01-26 NOTE — TELEPHONE ENCOUNTER
See MyChart from patient needing PCP review.    Please respond directly to patient if at all able.    Yusra Terry RN  [facility]

## 2023-01-27 ENCOUNTER — MYC MEDICAL ADVICE (OUTPATIENT)
Dept: FAMILY MEDICINE | Facility: CLINIC | Age: 43
End: 2023-01-27
Payer: COMMERCIAL

## 2023-01-27 DIAGNOSIS — R73.03 PREDIABETES: Primary | ICD-10-CM

## 2023-01-27 LAB — HBA1C MFR BLD: 6.4 %

## 2023-01-27 RX ORDER — OXYCODONE AND ACETAMINOPHEN 5; 325 MG/1; MG/1
2 TABLET ORAL
Qty: 20 TABLET | Refills: 0 | Status: SHIPPED | OUTPATIENT
Start: 2023-01-27 | End: 2023-06-05

## 2023-01-27 NOTE — TELEPHONE ENCOUNTER
See MyChart from Patient needing PCP review.  Please respond directly to patient, if at all able.    LANCE Saravia  [facility]

## 2023-01-30 PROBLEM — R73.03 PREDIABETES: Status: ACTIVE | Noted: 2023-01-30

## 2023-02-01 ENCOUNTER — MYC MEDICAL ADVICE (OUTPATIENT)
Dept: FAMILY MEDICINE | Facility: CLINIC | Age: 43
End: 2023-02-01
Payer: COMMERCIAL

## 2023-02-01 ENCOUNTER — TRANSFERRED RECORDS (OUTPATIENT)
Dept: HEALTH INFORMATION MANAGEMENT | Facility: CLINIC | Age: 43
End: 2023-02-01

## 2023-02-01 DIAGNOSIS — M1A.09X0 IDIOPATHIC CHRONIC GOUT OF MULTIPLE SITES WITHOUT TOPHUS: Primary | ICD-10-CM

## 2023-02-01 RX ORDER — COLCHICINE 0.6 MG/1
0.6 CAPSULE ORAL 2 TIMES DAILY
Qty: 90 CAPSULE | Refills: 1 | Status: SHIPPED | OUTPATIENT
Start: 2023-02-01 | End: 2023-02-20

## 2023-02-01 RX ORDER — ALLOPURINOL 100 MG/1
100 TABLET ORAL DAILY
Qty: 90 TABLET | Refills: 1 | Status: SHIPPED | OUTPATIENT
Start: 2023-02-01 | End: 2023-02-20

## 2023-02-02 NOTE — TELEPHONE ENCOUNTER
See MyChart from Patient needing PCP review.  Please respond directly to patient, if at all able.    LANCE Saravia  [facility]

## 2023-02-20 ENCOUNTER — TELEPHONE (OUTPATIENT)
Dept: FAMILY MEDICINE | Facility: CLINIC | Age: 43
End: 2023-02-20
Payer: COMMERCIAL

## 2023-02-20 DIAGNOSIS — M1A.09X0 IDIOPATHIC CHRONIC GOUT OF MULTIPLE SITES WITHOUT TOPHUS: ICD-10-CM

## 2023-02-20 RX ORDER — ALLOPURINOL 100 MG/1
100 TABLET ORAL DAILY
Qty: 90 TABLET | Refills: 4 | Status: SHIPPED | OUTPATIENT
Start: 2023-02-20 | End: 2023-06-13

## 2023-02-20 RX ORDER — COLCHICINE 0.6 MG/1
0.6 CAPSULE ORAL 2 TIMES DAILY
Qty: 90 CAPSULE | Refills: 1 | Status: SHIPPED | OUTPATIENT
Start: 2023-02-20 | End: 2023-06-05

## 2023-02-21 ENCOUNTER — TRANSFERRED RECORDS (OUTPATIENT)
Dept: HEALTH INFORMATION MANAGEMENT | Facility: CLINIC | Age: 43
End: 2023-02-21

## 2023-03-17 ENCOUNTER — E-VISIT (OUTPATIENT)
Dept: FAMILY MEDICINE | Facility: CLINIC | Age: 43
End: 2023-03-17
Payer: COMMERCIAL

## 2023-03-17 DIAGNOSIS — J02.9 SORE THROAT: Primary | ICD-10-CM

## 2023-03-17 PROCEDURE — 99421 OL DIG E/M SVC 5-10 MIN: CPT | Performed by: INTERNAL MEDICINE

## 2023-03-19 RX ORDER — AMOXICILLIN 500 MG/1
500 CAPSULE ORAL 2 TIMES DAILY
Qty: 20 CAPSULE | Refills: 0 | Status: SHIPPED | OUTPATIENT
Start: 2023-03-19 | End: 2023-03-29

## 2023-05-12 ENCOUNTER — MYC MEDICAL ADVICE (OUTPATIENT)
Dept: FAMILY MEDICINE | Facility: CLINIC | Age: 43
End: 2023-05-12
Payer: COMMERCIAL

## 2023-05-12 NOTE — TELEPHONE ENCOUNTER
See MyChart from Patient needing PCP review.  Please respond directly to patient, if at all able.    LANCE Saravia  [facility]

## 2023-05-13 ENCOUNTER — HEALTH MAINTENANCE LETTER (OUTPATIENT)
Age: 43
End: 2023-05-13

## 2023-05-31 ENCOUNTER — LAB (OUTPATIENT)
Dept: LAB | Facility: CLINIC | Age: 43
End: 2023-05-31
Payer: COMMERCIAL

## 2023-05-31 ENCOUNTER — MYC MEDICAL ADVICE (OUTPATIENT)
Dept: FAMILY MEDICINE | Facility: CLINIC | Age: 43
End: 2023-05-31

## 2023-05-31 DIAGNOSIS — R73.03 PREDIABETES: ICD-10-CM

## 2023-05-31 DIAGNOSIS — M1A.09X0 IDIOPATHIC CHRONIC GOUT OF MULTIPLE SITES WITHOUT TOPHUS: ICD-10-CM

## 2023-05-31 DIAGNOSIS — M1A.09X0 IDIOPATHIC CHRONIC GOUT OF MULTIPLE SITES WITHOUT TOPHUS: Primary | ICD-10-CM

## 2023-05-31 LAB
ANION GAP SERPL CALCULATED.3IONS-SCNC: 6 MMOL/L (ref 5–18)
BASOPHILS # BLD AUTO: 0.1 10E3/UL (ref 0–0.2)
BASOPHILS NFR BLD AUTO: 1 %
BUN SERPL-MCNC: 13 MG/DL (ref 8–22)
CALCIUM SERPL-MCNC: 9.8 MG/DL (ref 8.5–10.5)
CHLORIDE BLD-SCNC: 106 MMOL/L (ref 98–107)
CK SERPL-CCNC: 101 U/L (ref 30–190)
CO2 SERPL-SCNC: 31 MMOL/L (ref 22–31)
CREAT SERPL-MCNC: 1.05 MG/DL (ref 0.7–1.3)
EOSINOPHIL # BLD AUTO: 0.4 10E3/UL (ref 0–0.7)
EOSINOPHIL NFR BLD AUTO: 7 %
ERYTHROCYTE [DISTWIDTH] IN BLOOD BY AUTOMATED COUNT: 12.4 % (ref 10–15)
GFR SERPL CREATININE-BSD FRML MDRD: >90 ML/MIN/1.73M2
GLUCOSE BLD-MCNC: 87 MG/DL (ref 70–125)
HBA1C MFR BLD: 5.3 %
HCT VFR BLD AUTO: 47.9 % (ref 40–53)
HGB BLD-MCNC: 15.9 G/DL (ref 13.3–17.7)
IMM GRANULOCYTES # BLD: 0 10E3/UL
IMM GRANULOCYTES NFR BLD: 0 %
LYMPHOCYTES # BLD AUTO: 1.8 10E3/UL (ref 0.8–5.3)
LYMPHOCYTES NFR BLD AUTO: 26 %
MCH RBC QN AUTO: 30.1 PG (ref 26.5–33)
MCHC RBC AUTO-ENTMCNC: 33.2 G/DL (ref 31.5–36.5)
MCV RBC AUTO: 91 FL (ref 78–100)
MONOCYTES # BLD AUTO: 0.5 10E3/UL (ref 0–1.3)
MONOCYTES NFR BLD AUTO: 7 %
NEUTROPHILS # BLD AUTO: 4 10E3/UL (ref 1.6–8.3)
NEUTROPHILS NFR BLD AUTO: 59 %
NRBC # BLD AUTO: 0 10E3/UL
NRBC BLD AUTO-RTO: 0 /100
PLATELET # BLD AUTO: 295 10E3/UL (ref 150–450)
POTASSIUM BLD-SCNC: 4.2 MMOL/L (ref 3.5–5)
RBC # BLD AUTO: 5.28 10E6/UL (ref 4.4–5.9)
SODIUM SERPL-SCNC: 143 MMOL/L (ref 136–145)
URATE SERPL-MCNC: 6 MG/DL (ref 3–8)
WBC # BLD AUTO: 6.8 10E3/UL (ref 4–11)

## 2023-05-31 PROCEDURE — 36415 COLL VENOUS BLD VENIPUNCTURE: CPT

## 2023-05-31 PROCEDURE — 84550 ASSAY OF BLOOD/URIC ACID: CPT

## 2023-05-31 PROCEDURE — 83036 HEMOGLOBIN GLYCOSYLATED A1C: CPT

## 2023-05-31 PROCEDURE — 80048 BASIC METABOLIC PNL TOTAL CA: CPT

## 2023-05-31 PROCEDURE — 82550 ASSAY OF CK (CPK): CPT

## 2023-05-31 PROCEDURE — 85025 COMPLETE CBC W/AUTO DIFF WBC: CPT

## 2023-05-31 NOTE — TELEPHONE ENCOUNTER
Refill request for medication colchicine (MITIGARE) 0.6 MG capsule  Pharmacy: [id number] - [place], [place] - [address] [facility] [address]  [address]

## 2023-05-31 NOTE — TELEPHONE ENCOUNTER
S-(situation): Pt reporting body aches since treated for gout. Taking aleve nearly daily to manage pain. Wondering of body aches due to gout treatment    B-(background): LOV 1/24/23, gout flare at that time, L great toe swelling. Rx percocet given (prev Rx prednisone reported not helpful for pain). FMLA forms completed. Multiple messages re: gout since. Current Rx for allopurinol and colchicine written 2/20/23.    A-(assessment): Per UpToDate, colchicine can cause myalgia, myopathy, myotonia, rhabdomyolysis. Reply sent to patient asking about other symptoms that may be r/t rhabdo.    R-(recommendations): Routing to PCP to review and advise.

## 2023-06-01 RX ORDER — COLCHICINE 0.6 MG/1
0.6 CAPSULE ORAL 2 TIMES DAILY
Qty: 90 CAPSULE | Refills: 1 | OUTPATIENT
Start: 2023-06-01

## 2023-06-01 NOTE — TELEPHONE ENCOUNTER
"Routing refill request to provider for review/approval because:  Labs out of range:  Uric acid  Early refill request    Last Written Prescription Date:  2/20/23  Last Fill Quantity: 90,  # refills: 1   Last office visit provider:   1/24/34    Requested Prescriptions   Pending Prescriptions Disp Refills     colchicine (MITIGARE) 0.6 MG capsule 90 capsule 1     Sig: Take 1 capsule (0.6 mg) by mouth 2 times daily       Gout Agents Protocol Failed - 6/1/2023  3:25 PM        Failed - Has Uric Acid on file in past 12 months and value is less than 6     Recent Labs   Lab Test 05/31/23  1255   URIC 6.0     If level is 6mg/dL or greater, ok to refill one time and refer to provider.           Passed - CBC on file in past 12 months     Recent Labs   Lab Test 05/31/23  1255   WBC 6.8   RBC 5.28   HGB 15.9   HCT 47.9                    Passed - ALT on file in past 12 months     Recent Labs   Lab Test 01/24/23  0819   ALT 31             Passed - Recent (12 mo) or future (30 days) visit within the authorizing provider's specialty     Patient has had an office visit with the authorizing provider or a provider within the authorizing providers department within the previous 12 mos or has a future within next 30 days. See \"Patient Info\" tab in inbasket, or \"Choose Columns\" in Meds & Orders section of the refill encounter.              Passed - Medication is active on med list        Passed - Patient is age 18 or older        Passed - Normal serum creatinine on file in the past 12 months     Recent Labs   Lab Test 05/31/23  1255   CR 1.05       Ok to refill medication if creatinine is low               JIGNA FINN RN 06/01/23 3:25 PM  "

## 2023-06-05 ENCOUNTER — VIRTUAL VISIT (OUTPATIENT)
Dept: FAMILY MEDICINE | Facility: CLINIC | Age: 43
End: 2023-06-05
Payer: COMMERCIAL

## 2023-06-05 DIAGNOSIS — M1A.09X0 IDIOPATHIC CHRONIC GOUT OF MULTIPLE SITES WITHOUT TOPHUS: Primary | ICD-10-CM

## 2023-06-05 PROCEDURE — 99213 OFFICE O/P EST LOW 20 MIN: CPT | Mod: VID | Performed by: FAMILY MEDICINE

## 2023-06-05 ASSESSMENT — ASTHMA QUESTIONNAIRES
QUESTION_3 LAST FOUR WEEKS HOW OFTEN DID YOUR ASTHMA SYMPTOMS (WHEEZING, COUGHING, SHORTNESS OF BREATH, CHEST TIGHTNESS OR PAIN) WAKE YOU UP AT NIGHT OR EARLIER THAN USUAL IN THE MORNING: NOT AT ALL
QUESTION_1 LAST FOUR WEEKS HOW MUCH OF THE TIME DID YOUR ASTHMA KEEP YOU FROM GETTING AS MUCH DONE AT WORK, SCHOOL OR AT HOME: NONE OF THE TIME
QUESTION_5 LAST FOUR WEEKS HOW WOULD YOU RATE YOUR ASTHMA CONTROL: WELL CONTROLLED
ACT_TOTALSCORE: 23
QUESTION_2 LAST FOUR WEEKS HOW OFTEN HAVE YOU HAD SHORTNESS OF BREATH: NOT AT ALL
ACT_TOTALSCORE: 23
QUESTION_4 LAST FOUR WEEKS HOW OFTEN HAVE YOU USED YOUR RESCUE INHALER OR NEBULIZER MEDICATION (SUCH AS ALBUTEROL): ONCE A WEEK OR LESS

## 2023-06-05 NOTE — PROGRESS NOTES
Zack is a 42 year old who is being evaluated via a billable video visit.      How would you like to obtain your AVS? MyChart  If the video visit is dropped, the invitation should be resent by: Text to cell phone: [phone]  Will anyone else be joining your video visit? No          Assessment & Plan     Idiopathic chronic gout of multiple sites without tophus - to avoid colcrys side effects, advised BID dosing on naproxen for the next few days. If symptoms worsen, he can increase dose. Continue allopurinol. If symptoms persist despite measures listed above, would need to be seen in person.     Briefly discussed maintenance dosing with naproxen. He will try this as an alternative to the colcrys.     Ella Sultana MD  [facility]   Zack is a 42 year old, presenting for the following health issues:  Arthritis (Gout flare up)        6/5/2023     9:16 AM   Additional Questions   Roomed by Dina Castellanos     Arthritis    History of Present Illness       Reason for visit:  Gout    He eats 0-1 servings of fruits and vegetables daily.He consumes 0 sweetened beverage(s) daily. He exercises with enough effort to increase his heart rate 3 or less days per week.   He is taking medications regularly.       Left great toe pain.   No where near pain when he has had gout in the past.   Toe became hot and pain developed last night.     No recent injuries.   Stopped taking colcrys a few days ago because he believed it was causing muscle aches and worsening mood. Was advised by his MD.   Started taking colcyrs again this AM to treat current symptoms.     Has continued on allopurinol whole time. Last uric acid level was 6.     Reports avoiding red meat, alcohol, fish, etc. Drinking water during the day.       Review of Systems   Musculoskeletal: Positive for arthritis.          Objective    Vitals - Patient Reported  Weight (Patient Reported): 93 kg (205 lb)  Height (Patient Reported):  "175.3 cm (5' 9\")  BMI (Based on Pt Reported Ht/Wt): 30.27        Physical Exam   GENERAL: Healthy, alert and no distress  EYES: Eyes grossly normal to inspection.  No discharge or erythema, or obvious scleral/conjunctival abnormalities.  RESP: No audible wheeze, cough, or visible cyanosis.  No visible retractions or increased work of breathing.    SKIN: Visible skin clear. No significant rash, abnormal pigmentation or lesions.  NEURO: Cranial nerves grossly intact.  Mentation and speech appropriate for age.  PSYCH: Mentation appears normal, affect normal/bright, judgement and insight intact, normal speech and appearance well-groomed.        Video-Visit Details    Type of service:  Video Visit     Originating Location (pt. Location): Home    Distant Location (provider location):  On-site  Platform used for Video Visit: Sariah    "

## 2023-06-08 ENCOUNTER — MYC MEDICAL ADVICE (OUTPATIENT)
Dept: FAMILY MEDICINE | Facility: CLINIC | Age: 43
End: 2023-06-08
Payer: COMMERCIAL

## 2023-06-08 DIAGNOSIS — M1A.09X0 IDIOPATHIC CHRONIC GOUT OF MULTIPLE SITES WITHOUT TOPHUS: ICD-10-CM

## 2023-06-09 ENCOUNTER — MYC MEDICAL ADVICE (OUTPATIENT)
Dept: FAMILY MEDICINE | Facility: CLINIC | Age: 43
End: 2023-06-09
Payer: COMMERCIAL

## 2023-06-09 DIAGNOSIS — M10.00 IDIOPATHIC GOUT, UNSPECIFIED CHRONICITY, UNSPECIFIED SITE: Primary | ICD-10-CM

## 2023-06-09 RX ORDER — PREDNISONE 20 MG/1
40 TABLET ORAL DAILY
Qty: 10 TABLET | Refills: 0 | Status: SHIPPED | OUTPATIENT
Start: 2023-06-09 | End: 2023-06-14

## 2023-06-13 RX ORDER — COLCHICINE 0.6 MG/1
0.6 TABLET ORAL DAILY
Qty: 90 TABLET | Refills: 0 | Status: SHIPPED | OUTPATIENT
Start: 2023-06-13 | End: 2023-06-14

## 2023-06-13 RX ORDER — ALLOPURINOL 200 MG/1
100 TABLET ORAL DAILY
Qty: 90 TABLET | Refills: 3 | Status: SHIPPED | OUTPATIENT
Start: 2023-06-13 | End: 2023-06-14

## 2023-06-13 RX ORDER — PREDNISONE 20 MG/1
40 TABLET ORAL DAILY
Qty: 10 TABLET | Refills: 0 | Status: SHIPPED | OUTPATIENT
Start: 2023-06-13 | End: 2023-06-18

## 2023-06-14 ENCOUNTER — TELEPHONE (OUTPATIENT)
Dept: FAMILY MEDICINE | Facility: CLINIC | Age: 43
End: 2023-06-14
Payer: COMMERCIAL

## 2023-06-14 DIAGNOSIS — M1A.09X0 IDIOPATHIC CHRONIC GOUT OF MULTIPLE SITES WITHOUT TOPHUS: ICD-10-CM

## 2023-06-14 RX ORDER — ALLOPURINOL 200 MG/1
200 TABLET ORAL DAILY
Qty: 90 TABLET | Refills: 3 | Status: SHIPPED | OUTPATIENT
Start: 2023-06-14 | End: 2024-03-22

## 2023-06-14 NOTE — TELEPHONE ENCOUNTER
Dr. Lord - Please clarify prescription for pharmacy.    Prescription sent 6/13/2023        Is patient to take 1/2 tab of the 200 mg tablets for dose of 100 mg noted in Sig?

## 2023-06-14 NOTE — TELEPHONE ENCOUNTER
"allopurinol 200 MG TABS    Message from Della - \"Please clarify the dose is it 100mg or 200 mg\" Please advise.  "

## 2023-08-22 DIAGNOSIS — G47.33 OBSTRUCTIVE SLEEP APNEA (ADULT) (PEDIATRIC): Primary | ICD-10-CM

## 2023-10-11 ENCOUNTER — DOCUMENTATION ONLY (OUTPATIENT)
Dept: FAMILY MEDICINE | Facility: CLINIC | Age: 43
End: 2023-10-11
Payer: COMMERCIAL

## 2023-10-11 DIAGNOSIS — G47.33 OBSTRUCTIVE SLEEP APNEA (ADULT) (PEDIATRIC): Primary | ICD-10-CM

## 2023-10-17 ENCOUNTER — OFFICE VISIT (OUTPATIENT)
Dept: PODIATRY | Facility: CLINIC | Age: 43
End: 2023-10-17
Payer: COMMERCIAL

## 2023-10-17 VITALS
DIASTOLIC BLOOD PRESSURE: 76 MMHG | RESPIRATION RATE: 16 BRPM | HEART RATE: 71 BPM | SYSTOLIC BLOOD PRESSURE: 124 MMHG | BODY MASS INDEX: 32.74 KG/M2 | OXYGEN SATURATION: 96 % | WEIGHT: 216 LBS | HEIGHT: 68 IN

## 2023-10-17 DIAGNOSIS — M77.52 CAPSULITIS OF METATARSOPHALANGEAL (MTP) JOINT OF LEFT FOOT: Primary | ICD-10-CM

## 2023-10-17 PROCEDURE — 99203 OFFICE O/P NEW LOW 30 MIN: CPT | Performed by: PODIATRIST

## 2023-10-17 ASSESSMENT — PAIN SCALES - GENERAL: PAINLEVEL: NO PAIN (0)

## 2023-10-17 NOTE — PROGRESS NOTES
FOOT AND ANKLE SURGERY/PODIATRY CONSULT NOTE         ASSESSMENT:   Capsulitis first MPJ left foot  Gout      TREATMENT:  -I discussed with the patient that there is mild pain along the first MPJ on the left foot.  Full range of motion of the first MPJ without discomfort.    -Based on the above, I discussed with the patient that his symptoms are likely related to capsulitis which may be predisposing him due to the underlying issue of acute gout in the past.  Current symptoms do not appear to be directly related to acute gout.    -I recommend continued anti-inflammatories as needed for pain which is worked for him in the past.    -I referred him to [facility] for custom inserts.    -Recommend stiff soled shoes and avoid running or long walks over the next several weeks.  We will consider steroid injection if symptoms persist.    -Patient's questions invited and answered.  He was encouraged to call my office with any further questions or concerns.     Ricki Hallman DPM  [facility]/Foot & Ankle Surgery      HPI: I was asked to see Zack Nelson today for left first MPJ pain.  Patient reports that he had a significant gout attack in January and February of this year requiring oral steroids and finally a steroid injection which relieved his symptoms.  He has noticed over the past several weeks with exercise increased discomfort along the first MPJ on the left foot.  Denies trauma.  He does admit working on his feet at [employer].  He is taking allopurinol as directed.    Past Medical History:   Diagnosis Date    Uncomplicated asthma          Social History     Socioeconomic History    Marital status:      Spouse name: Not on file    Number of children: Not on file    Years of education: Not on file    Highest education level: Not on file   Occupational History    Not on file   Tobacco Use    Smoking status: Never     Passive exposure: Never    Smokeless  "tobacco: Never   Vaping Use    Vaping Use: Never used   Substance and Sexual Activity    Alcohol use: Not Currently     Alcohol/week: 2.0 standard drinks of alcohol    Drug use: Never    Sexual activity: Yes     Partners: Female   Other Topics Concern    Not on file   Social History Narrative    Not on file     Social Determinants of Health     Financial Resource Strain: Not on file   Food Insecurity: Not on file   Transportation Needs: Not on file   Physical Activity: Not on file   Stress: Not on file   Social Connections: Not on file   Interpersonal Safety: Not on file   Housing Stability: Not on file          No Known Allergies      MEDICATIONS:   Current Outpatient Medications   Medication    albuterol (PROAIR HFA/PROVENTIL HFA/VENTOLIN HFA) 108 (90 Base) MCG/ACT inhaler    albuterol (PROVENTIL) (2.5 MG/3ML) 0.083% neb solution    Allopurinol 200 MG TABS    azelastine (ASTEPRO) 0.15 % (205.5 mcg) Spry nasal spray    ipratropium-albuterol (COMBIVENT RESPIMAT)  MCG/ACT inhaler     No current facility-administered medications for this visit.        Family History   Problem Relation Age of Onset    Arthritis Mother     Hyperlipidemia Mother     Atrial fibrillation Father     Diabetes No family hx of     Cancer No family hx of           Review of Systems - 10 point Review of Systems is negative except for left foot pain which is noted in HPI.    OBJECTIVE:  Appearance: alert, well appearing, and in no distress.    VITAL SIGNS: /76   Pulse 71   Resp 16   Ht 1.734 m (5' 8.25\")   Wt 98 kg (216 lb)   SpO2 96%   BMI 32.60 kg/m        General appearance: Patient is alert and fully cooperative with history & exam.  No sign of distress is noted during the visit.     Psychiatric: Affect is pleasant & appropriate.  Patient appears motivated to improve health.     Respiratory: Breathing is regular & unlabored while sitting.     HEENT: Hearing is intact to spoken word.  Speech is clear.  No gross evidence of " visual impairment that would impact ambulation.      Vascular: Dorsalis pedis and posterior tibial pulses are palpable. There is pedal hair growth left.  CFT < 3 sec from anterior tibial surface to distal digits left. There is no appreciable edema noted.  Dermatologic: Turgor and texture are within normal limits. No coloration or temperature changes. No primary or secondary lesions noted.  Neurologic: All epicritic and proprioceptive sensations are grossly intact left.  Musculoskeletal: Mild pain to palpation 1st MPJ left foot.  Full range of motion first MPJ left foot without crepitus locking or discomfort including IPJ of the hallux.  No pain to palpation along plantar first MPJ along the sesamoids left foot.

## 2023-10-17 NOTE — LETTER
10/17/2023         RE: Zack Nelson  [address] [facility] [id number]        Dear Colleague,    Thank you for referring your patient, Zack Nelson, to the [facility]. Please see a copy of my visit note below.          FOOT AND ANKLE SURGERY/PODIATRY CONSULT NOTE         ASSESSMENT:   Capsulitis first MPJ left foot  Gout      TREATMENT:  -I discussed with the patient that there is mild pain along the first MPJ on the left foot.  Full range of motion of the first MPJ without discomfort.    -Based on the above, I discussed with the patient that his symptoms are likely related to capsulitis which may be predisposing him due to the underlying issue of acute gout in the past.  Current symptoms do not appear to be directly related to acute gout.    -I recommend continued anti-inflammatories as needed for pain which is worked for him in the past.    -I referred him to [facility] orthotics and prosthetics for custom inserts.    -Recommend stiff soled shoes and avoid running or long walks over the next several weeks.  We will consider steroid injection if symptoms persist.    -Patient's questions invited and answered.  He was encouraged to call my office with any further questions or concerns.     Ricki Hallman DPM  [facility] Podiatry/Foot & Ankle Surgery      HPI: I was asked to see Zack Nelson today for left first MPJ pain.  Patient reports that he had a significant gout attack in January and February of this year requiring oral steroids and finally a steroid injection which relieved his symptoms.  He has noticed over the past several weeks with exercise increased discomfort along the first MPJ on the left foot.  Denies trauma.  He does admit working on his feet at [employer].  He is taking allopurinol as directed.    Past Medical History:   Diagnosis Date     Uncomplicated asthma          Social History     Socioeconomic History     Marital status:   "    Spouse name: Not on file     Number of children: Not on file     Years of education: Not on file     Highest education level: Not on file   Occupational History     Not on file   Tobacco Use     Smoking status: Never     Passive exposure: Never     Smokeless tobacco: Never   Vaping Use     Vaping Use: Never used   Substance and Sexual Activity     Alcohol use: Not Currently     Alcohol/week: 2.0 standard drinks of alcohol     Drug use: Never     Sexual activity: Yes     Partners: Female   Other Topics Concern     Not on file   Social History Narrative     Not on file     Social Determinants of Health     Financial Resource Strain: Not on file   Food Insecurity: Not on file   Transportation Needs: Not on file   Physical Activity: Not on file   Stress: Not on file   Social Connections: Not on file   Interpersonal Safety: Not on file   Housing Stability: Not on file          No Known Allergies      MEDICATIONS:   Current Outpatient Medications   Medication     albuterol (PROAIR HFA/PROVENTIL HFA/VENTOLIN HFA) 108 (90 Base) MCG/ACT inhaler     albuterol (PROVENTIL) (2.5 MG/3ML) 0.083% neb solution     Allopurinol 200 MG TABS     azelastine (ASTEPRO) 0.15 % (205.5 mcg) Spry nasal spray     ipratropium-albuterol (COMBIVENT RESPIMAT)  MCG/ACT inhaler     No current facility-administered medications for this visit.        Family History   Problem Relation Age of Onset     Arthritis Mother      Hyperlipidemia Mother      Atrial fibrillation Father      Diabetes No family hx of      Cancer No family hx of           Review of Systems - 10 point Review of Systems is negative except for left foot pain which is noted in HPI.    OBJECTIVE:  Appearance: alert, well appearing, and in no distress.    VITAL SIGNS: /76   Pulse 71   Resp 16   Ht 1.734 m (5' 8.25\")   Wt 98 kg (216 lb)   SpO2 96%   BMI 32.60 kg/m        General appearance: Patient is alert and fully cooperative with history & exam.  No sign of " distress is noted during the visit.     Psychiatric: Affect is pleasant & appropriate.  Patient appears motivated to improve health.     Respiratory: Breathing is regular & unlabored while sitting.     HEENT: Hearing is intact to spoken word.  Speech is clear.  No gross evidence of visual impairment that would impact ambulation.      Vascular: Dorsalis pedis and posterior tibial pulses are palpable. There is pedal hair growth left.  CFT < 3 sec from anterior tibial surface to distal digits left. There is no appreciable edema noted.  Dermatologic: Turgor and texture are within normal limits. No coloration or temperature changes. No primary or secondary lesions noted.  Neurologic: All epicritic and proprioceptive sensations are grossly intact left.  Musculoskeletal: Mild pain to palpation 1st MPJ left foot.  Full range of motion first MPJ left foot without crepitus locking or discomfort including IPJ of the hallux.  No pain to palpation along plantar first MPJ along the sesamoids left foot.          Again, thank you for allowing me to participate in the care of your patient.        Sincerely,        Ricki Hallman DPM

## 2023-11-27 ENCOUNTER — MYC REFILL (OUTPATIENT)
Dept: FAMILY MEDICINE | Facility: CLINIC | Age: 43
End: 2023-11-27
Payer: COMMERCIAL

## 2023-11-27 ENCOUNTER — MYC MEDICAL ADVICE (OUTPATIENT)
Dept: FAMILY MEDICINE | Facility: CLINIC | Age: 43
End: 2023-11-27
Payer: COMMERCIAL

## 2023-11-27 DIAGNOSIS — M1A.09X0 IDIOPATHIC CHRONIC GOUT OF MULTIPLE SITES WITHOUT TOPHUS: ICD-10-CM

## 2023-11-27 DIAGNOSIS — J45.40 MODERATE PERSISTENT ASTHMA WITHOUT COMPLICATION: ICD-10-CM

## 2023-11-27 RX ORDER — ALLOPURINOL 200 MG/1
200 TABLET ORAL DAILY
Qty: 90 TABLET | Refills: 3 | OUTPATIENT
Start: 2023-11-27

## 2023-11-27 RX ORDER — ALBUTEROL SULFATE 90 UG/1
2 AEROSOL, METERED RESPIRATORY (INHALATION) EVERY 6 HOURS PRN
Qty: 36 G | Refills: 1 | Status: SHIPPED | OUTPATIENT
Start: 2023-11-27 | End: 2024-03-22

## 2024-01-09 ENCOUNTER — IMMUNIZATION (OUTPATIENT)
Dept: NURSING | Facility: CLINIC | Age: 44
End: 2024-01-09
Payer: COMMERCIAL

## 2024-01-09 PROCEDURE — 91320 SARSCV2 VAC 30MCG TRS-SUC IM: CPT

## 2024-01-09 PROCEDURE — 90480 ADMN SARSCOV2 VAC 1/ONLY CMP: CPT

## 2024-03-19 SDOH — HEALTH STABILITY: PHYSICAL HEALTH: ON AVERAGE, HOW MANY DAYS PER WEEK DO YOU ENGAGE IN MODERATE TO STRENUOUS EXERCISE (LIKE A BRISK WALK)?: 3 DAYS

## 2024-03-19 SDOH — HEALTH STABILITY: PHYSICAL HEALTH: ON AVERAGE, HOW MANY MINUTES DO YOU ENGAGE IN EXERCISE AT THIS LEVEL?: 30 MIN

## 2024-03-19 ASSESSMENT — ASTHMA QUESTIONNAIRES
QUESTION_3 LAST FOUR WEEKS HOW OFTEN DID YOUR ASTHMA SYMPTOMS (WHEEZING, COUGHING, SHORTNESS OF BREATH, CHEST TIGHTNESS OR PAIN) WAKE YOU UP AT NIGHT OR EARLIER THAN USUAL IN THE MORNING: NOT AT ALL
ACT_TOTALSCORE: 23
QUESTION_2 LAST FOUR WEEKS HOW OFTEN HAVE YOU HAD SHORTNESS OF BREATH: NOT AT ALL
QUESTION_5 LAST FOUR WEEKS HOW WOULD YOU RATE YOUR ASTHMA CONTROL: WELL CONTROLLED
ACT_TOTALSCORE: 23
QUESTION_4 LAST FOUR WEEKS HOW OFTEN HAVE YOU USED YOUR RESCUE INHALER OR NEBULIZER MEDICATION (SUCH AS ALBUTEROL): ONCE A WEEK OR LESS
QUESTION_1 LAST FOUR WEEKS HOW MUCH OF THE TIME DID YOUR ASTHMA KEEP YOU FROM GETTING AS MUCH DONE AT WORK, SCHOOL OR AT HOME: NONE OF THE TIME

## 2024-03-19 ASSESSMENT — SOCIAL DETERMINANTS OF HEALTH (SDOH): HOW OFTEN DO YOU GET TOGETHER WITH FRIENDS OR RELATIVES?: NEVER

## 2024-03-22 ENCOUNTER — OFFICE VISIT (OUTPATIENT)
Dept: FAMILY MEDICINE | Facility: CLINIC | Age: 44
End: 2024-03-22
Payer: COMMERCIAL

## 2024-03-22 VITALS
TEMPERATURE: 98.6 F | BODY MASS INDEX: 31.52 KG/M2 | DIASTOLIC BLOOD PRESSURE: 70 MMHG | OXYGEN SATURATION: 97 % | HEART RATE: 89 BPM | RESPIRATION RATE: 18 BRPM | WEIGHT: 208 LBS | SYSTOLIC BLOOD PRESSURE: 106 MMHG | HEIGHT: 68 IN

## 2024-03-22 DIAGNOSIS — M54.42 CHRONIC LEFT-SIDED LOW BACK PAIN WITH LEFT-SIDED SCIATICA: Primary | ICD-10-CM

## 2024-03-22 DIAGNOSIS — N52.8 OTHER MALE ERECTILE DYSFUNCTION: ICD-10-CM

## 2024-03-22 DIAGNOSIS — Z00.00 ROUTINE GENERAL MEDICAL EXAMINATION AT A HEALTH CARE FACILITY: ICD-10-CM

## 2024-03-22 DIAGNOSIS — M1A.09X0 IDIOPATHIC CHRONIC GOUT OF MULTIPLE SITES WITHOUT TOPHUS: ICD-10-CM

## 2024-03-22 DIAGNOSIS — K64.4 EXTERNAL HEMORRHOIDS: ICD-10-CM

## 2024-03-22 DIAGNOSIS — Z12.11 SCREENING FOR COLON CANCER: ICD-10-CM

## 2024-03-22 DIAGNOSIS — G89.29 CHRONIC LEFT-SIDED LOW BACK PAIN WITH LEFT-SIDED SCIATICA: Primary | ICD-10-CM

## 2024-03-22 DIAGNOSIS — J45.40 MODERATE PERSISTENT ASTHMA WITHOUT COMPLICATION: ICD-10-CM

## 2024-03-22 LAB
BASOPHILS # BLD AUTO: 0 10E3/UL (ref 0–0.2)
BASOPHILS NFR BLD AUTO: 1 %
EOSINOPHIL # BLD AUTO: 0.2 10E3/UL (ref 0–0.7)
EOSINOPHIL NFR BLD AUTO: 3 %
ERYTHROCYTE [DISTWIDTH] IN BLOOD BY AUTOMATED COUNT: 12.5 % (ref 10–15)
HBA1C MFR BLD: 5.5 % (ref 0–5.6)
HCT VFR BLD AUTO: 45.2 % (ref 40–53)
HGB BLD-MCNC: 15.1 G/DL (ref 13.3–17.7)
IMM GRANULOCYTES # BLD: 0 10E3/UL
IMM GRANULOCYTES NFR BLD: 0 %
LYMPHOCYTES # BLD AUTO: 1.8 10E3/UL (ref 0.8–5.3)
LYMPHOCYTES NFR BLD AUTO: 22 %
MCH RBC QN AUTO: 30.1 PG (ref 26.5–33)
MCHC RBC AUTO-ENTMCNC: 33.4 G/DL (ref 31.5–36.5)
MCV RBC AUTO: 90 FL (ref 78–100)
MONOCYTES # BLD AUTO: 0.4 10E3/UL (ref 0–1.3)
MONOCYTES NFR BLD AUTO: 5 %
NEUTROPHILS # BLD AUTO: 5.6 10E3/UL (ref 1.6–8.3)
NEUTROPHILS NFR BLD AUTO: 70 %
PLATELET # BLD AUTO: 289 10E3/UL (ref 150–450)
RBC # BLD AUTO: 5.02 10E6/UL (ref 4.4–5.9)
WBC # BLD AUTO: 8 10E3/UL (ref 4–11)

## 2024-03-22 PROCEDURE — 84550 ASSAY OF BLOOD/URIC ACID: CPT | Performed by: INTERNAL MEDICINE

## 2024-03-22 PROCEDURE — 90471 IMMUNIZATION ADMIN: CPT | Performed by: INTERNAL MEDICINE

## 2024-03-22 PROCEDURE — 80053 COMPREHEN METABOLIC PANEL: CPT | Performed by: INTERNAL MEDICINE

## 2024-03-22 PROCEDURE — 90715 TDAP VACCINE 7 YRS/> IM: CPT | Performed by: INTERNAL MEDICINE

## 2024-03-22 PROCEDURE — 85025 COMPLETE CBC W/AUTO DIFF WBC: CPT | Performed by: INTERNAL MEDICINE

## 2024-03-22 PROCEDURE — 84403 ASSAY OF TOTAL TESTOSTERONE: CPT | Performed by: INTERNAL MEDICINE

## 2024-03-22 PROCEDURE — 36415 COLL VENOUS BLD VENIPUNCTURE: CPT | Performed by: INTERNAL MEDICINE

## 2024-03-22 PROCEDURE — 80061 LIPID PANEL: CPT | Performed by: INTERNAL MEDICINE

## 2024-03-22 PROCEDURE — 84270 ASSAY OF SEX HORMONE GLOBUL: CPT | Performed by: INTERNAL MEDICINE

## 2024-03-22 PROCEDURE — 99214 OFFICE O/P EST MOD 30 MIN: CPT | Mod: 25 | Performed by: INTERNAL MEDICINE

## 2024-03-22 PROCEDURE — 84443 ASSAY THYROID STIM HORMONE: CPT | Performed by: INTERNAL MEDICINE

## 2024-03-22 PROCEDURE — 83036 HEMOGLOBIN GLYCOSYLATED A1C: CPT | Performed by: INTERNAL MEDICINE

## 2024-03-22 PROCEDURE — 99396 PREV VISIT EST AGE 40-64: CPT | Mod: 25 | Performed by: INTERNAL MEDICINE

## 2024-03-22 RX ORDER — ALLOPURINOL 200 MG/1
200 TABLET ORAL DAILY
Qty: 90 TABLET | Refills: 3 | Status: SHIPPED | OUTPATIENT
Start: 2024-03-22 | End: 2024-04-16

## 2024-03-22 RX ORDER — SILDENAFIL 50 MG/1
50 TABLET, FILM COATED ORAL DAILY PRN
Qty: 30 TABLET | Refills: 3 | Status: SHIPPED | OUTPATIENT
Start: 2024-03-22

## 2024-03-22 RX ORDER — ALBUTEROL SULFATE 90 UG/1
2 AEROSOL, METERED RESPIRATORY (INHALATION) EVERY 6 HOURS PRN
Qty: 36 G | Refills: 11 | Status: SHIPPED | OUTPATIENT
Start: 2024-03-22

## 2024-03-22 NOTE — PROGRESS NOTES
Preventive Care Visit  [facility]  Wily Lord MD, Internal Medicine - Pediatrics  Mar 22, 2024      Assessment & Plan     Routine general medical examination at a health care facility  Discussed routine health guidance.  - CBC with platelets and differential; Future  - Hemoglobin A1c; Future  - Comprehensive metabolic panel; Future  - Uric acid; Future  - Lipid panel reflex to direct LDL Non-fasting; Future  - TSH with free T4 reflex; Future  - Testosterone Free and Total; Future  - CBC with platelets and differential  - Hemoglobin A1c  - Comprehensive metabolic panel  - Uric acid  - Lipid panel reflex to direct LDL Non-fasting  - TSH with free T4 reflex  - Testosterone Free and Total    Idiopathic chronic gout of multiple sites without tophus  Doing well on allopurinol therapy labs for monitoring as noted above.  - Allopurinol 200 MG TABS; Take 200 mg by mouth daily    Chronic left-sided low back pain with left-sided sciatica  Will start with physical therapy for further evaluation.  - Physical Therapy  Referral; Future    Moderate persistent asthma without complication  Well-controlled continue intermittent use of albuterol.  - albuterol (PROAIR HFA/PROVENTIL HFA/VENTOLIN HFA) 108 (90 Base) MCG/ACT inhaler; Inhale 2 puffs into the lungs every 6 hours as needed for shortness of breath, wheezing or cough    Screening for colon cancer  Will refer for screening.  - Colonoscopy Screening  Referral; Future    External hemorrhoids  Noted by history colonoscopy as noted.  - Colonoscopy Screening  Referral; Future    Other male erectile dysfunction  Will check labs for contributing causes and trial sildenafil for improvement in symptoms.  - Testosterone Free and Total; Future  - sildenafil (VIAGRA) 50 MG tablet; Take 1 tablet (50 mg) by mouth daily as needed (erections)  - Testosterone Free and Total              BMI  Estimated body mass index is 31.38 kg/m  as  "calculated from the following:    Height as of this encounter: 1.734 m (5' 8.27\").    Weight as of this encounter: 94.3 kg (208 lb).   Weight management plan: Discussed healthy diet and exercise guidelines    Counseling  Appropriate preventive services were discussed with this patient, including applicable screening as appropriate for fall prevention, nutrition, physical activity, Tobacco-use cessation, weight loss and cognition.  Checklist reviewing preventive services available has been given to the patient.  Reviewed patient's diet, addressing concerns and/or questions.   He is at risk for lack of exercise and has been provided with information to increase physical activity for the benefit of his well-being.   Patient is at risk for social isolation and has been provided with information about the benefit of social connection.   He is at risk for psychosocial distress and has been provided with information to reduce risk.           Misa Dixon is a 43 year old, presenting for the following:  Annual Visit (Back issue)        3/22/2024    11:14 AM   Additional Questions   Roomed by LANCE Guzman        Health Care Directive  Patient does not have a Health Care Directive or Living Will: Discussed advance care planning with patient; information given to patient to review.    HPI  Lower left back issue has been several months, going down the left leg- was period of time that was bad for several weeks. Has been slightly better- 2/10 was 7/10-     Gout has been doing well no flares on allopurinol therapy    He notes occasional blood in stool with wiping and on stool.  Improves when he does not have constipation.    Asthma seems well-controlled.        9/22/2022    10:28 AM 6/5/2023     8:09 AM 3/19/2024    10:17 AM   ACT Total Scores   ACT TOTAL SCORE (Goal Greater than or Equal to 20) 21 23 23   In the past 12 months, how many times did you visit the emergency room for your asthma without being admitted to the " hospital? 0 0 0   In the past 12 months, how many times were you hospitalized overnight because of your asthma? 0 0 0         Wt Readings from Last 4 Encounters:   03/22/24 94.3 kg (208 lb)   10/17/23 98 kg (216 lb)   01/17/23 98 kg (216 lb)   09/23/22 94.8 kg (209 lb)           3/19/2024   General Health   How would you rate your overall physical health? Good   Feel stress (tense, anxious, or unable to sleep) Only a little   (!) STRESS CONCERN      3/19/2024   Nutrition   Three or more servings of calcium each day? Yes   Diet: Regular (no restrictions)   How many servings of fruit and vegetables per day? (!) 0-1   How many sweetened beverages each day? 0-1         3/19/2024   Exercise   Days per week of moderate/strenous exercise 3 days   Average minutes spent exercising at this level 30 min         3/19/2024   Social Factors   Frequency of gathering with friends or relatives Never   Worry food won't last until get money to buy more No   Food not last or not have enough money for food? No   Do you have housing?  Yes   Are you worried about losing your housing? No   Lack of transportation? No   Unable to get utilities (heat,electricity)? No   (!) SOCIAL CONNECTIONS CONCERN      3/19/2024   Dental   Dentist two times every year? Yes            Today's PHQ-2 Score:       3/22/2024    11:03 AM   PHQ-2 ( 1999 [employer])   Q1: Little interest or pleasure in doing things 0   Q2: Feeling down, depressed or hopeless 0   PHQ-2 Score 0   Q1: Little interest or pleasure in doing things Not at all   Q2: Feeling down, depressed or hopeless Not at all   PHQ-2 Score 0           3/19/2024   Substance Use   Alcohol more than 3/day or more than 7/wk No   Do you use any other substances recreationally? No     Social History     Tobacco Use    Smoking status: Never     Passive exposure: Never    Smokeless tobacco: Never   Vaping Use    Vaping Use: Never used   Substance Use Topics    Alcohol use: Not Currently     Alcohol/week: 2.0  "standard drinks of alcohol    Drug use: Never           3/19/2024   STI Screening   New sexual partner(s) since last STI/HIV test? No   ASCVD Risk   The 10-year ASCVD risk score (Sheridan RODRIGUEZ, et al., 2019) is: 1.2%    Values used to calculate the score:      Age: 43 years      Sex: Male      Is Non- : No      Diabetic: No      Tobacco smoker: No      Systolic Blood Pressure: 106 mmHg      Is BP treated: No      HDL Cholesterol: 48 mg/dL      Total Cholesterol: 212 mg/dL        3/19/2024   Contraception/Family Planning   Questions about contraception or family planning No        Reviewed and updated as needed this visit by Provider                          Review of Systems  Constitutional, HEENT, cardiovascular, pulmonary, gi and gu systems are negative, except as otherwise noted.     Objective    Exam  /70 (BP Location: Right arm, Patient Position: Sitting, Cuff Size: Adult Regular)   Pulse 89   Temp 98.6  F (37  C) (Oral)   Ht 1.734 m (5' 8.27\")   Wt 94.3 kg (208 lb)   SpO2 97%   BMI 31.38 kg/m     Estimated body mass index is 31.38 kg/m  as calculated from the following:    Height as of this encounter: 1.734 m (5' 8.27\").    Weight as of this encounter: 94.3 kg (208 lb).    Physical Exam  GENERAL: alert and no distress  EYES: Eyes grossly normal to inspection, PERRL and conjunctivae and sclerae normal  HENT: ear canals and TM's normal, nose and mouth without ulcers or lesions  NECK: no adenopathy, no asymmetry, masses, or scars  RESP: lungs clear to auscultation - no rales, rhonchi or wheezes  CV: regular rate and rhythm, normal S1 S2, no S3 or S4, no murmur, click or rub, no peripheral edema  ABDOMEN: soft, nontender, no hepatosplenomegaly, no masses and bowel sounds normal  MS: no gross musculoskeletal defects noted, no edema  NEURO: Normal strength and tone, mentation intact and speech normal  PSYCH: mentation appears normal, affect normal/bright        Signed " Electronically by: Wily Lord MD

## 2024-03-22 NOTE — PATIENT INSTRUCTIONS
They should call to set up with physical therapy if needing  We will check labs today as we discussed.   Call to set up with colonoscopy at [facility].   Can try the viagra to see if helps with erections- let me know if issues  Tetanus shot today.  Refilled medications- let me know if issues.     Wily Lord MD    Preventive Care Advice   This is general advice given by our system to help you stay healthy. However, your care team may have specific advice just for you. Please talk to your care team about your preventive care needs.  Nutrition  Eat 5 or more servings of fruits and vegetables each day.  Try wheat bread, brown rice and whole grain pasta (instead of white bread, rice, and pasta).  Get enough calcium and vitamin D. Check the label on foods and aim for 100% of the RDA (recommended daily allowance).  Lifestyle  Exercise at least 150 minutes each week   (30 minutes a day, 5 days a week).  Do muscle strengthening activities 2 days a week. These help control your weight and prevent disease.  No smoking.  Wear sunscreen to prevent skin cancer.  Have a dental exam and cleaning every 6 months.  Yearly exams  See your health care team every year to talk about:  Any changes in your health.  Any medicines your care team has prescribed.  Preventive care, family planning, and ways to prevent chronic diseases.  Shots (vaccines)   HPV shots (up to age 26), if you've never had them before.  Hepatitis B shots (up to age 59), if you've never had them before.  COVID-19 shot: Get this shot when it's due.  Flu shot: Get a flu shot every year.  Tetanus shot: Get a tetanus shot every 10 years.  Pneumococcal, hepatitis A, and RSV shots: Ask your care team if you need these based on your risk.  Shingles shot (for age 50 and up).  General health tests  Diabetes screening:  Starting at age 35, Get screened for diabetes at least every 3 years.  If you are younger than age 35, ask your care team if you should be screened for  diabetes.  Cholesterol test: At age 39, start having a cholesterol test every 5 years, or more often if advised.  Bone density scan (DEXA): At age 50, ask your care team if you should have this scan for osteoporosis (brittle bones).  Hepatitis C: Get tested at least once in your life.  STIs (sexually transmitted infections)  Before age 24: Ask your care team if you should be screened for STIs.  After age 24: Get screened for STIs if you're at risk. You are at risk for STIs (including HIV) if:  You are sexually active with more than one person.  You don't use condoms every time.  You or a partner was diagnosed with a sexually transmitted infection.  If you are at risk for HIV, ask about PrEP medicine to prevent HIV.  Get tested for HIV at least once in your life, whether you are at risk for HIV or not.  Cancer screening tests  Cervical cancer screening: If you have a cervix, begin getting regular cervical cancer screening tests at age 21. Most people who have regular screenings with normal results can stop after age 65. Talk about this with your provider.  Breast cancer scan (mammogram): If you've ever had breasts, begin having regular mammograms starting at age 40. This is a scan to check for breast cancer.  Colon cancer screening: It is important to start screening for colon cancer at age 45.  Have a colonoscopy test every 10 years (or more often if you're at risk) Or, ask your provider about stool tests like a FIT test every year or Cologuard test every 3 years.  To learn more about your testing options, visit: https://www.Xention/774879.pdf.  For help making a decision, visit: https://bit.ly/nr28751.  Prostate cancer screening test: If you have a prostate and are age 55 to 69, ask your provider if you would benefit from a yearly prostate cancer screening test.  Lung cancer screening: If you are a current or former smoker age 50 to 80, ask your care team if ongoing lung cancer screenings are right for  you.  For informational purposes only. Not to replace the advice of your health care provider. Copyright   2023 [facility]. All rights reserved. Clinically reviewed by the [facility]. [employer] 264763 - REV 01/24.    Relationships for Good Health  Relationships are important for our health and happiness. Social isolation, loneliness and lack of support are bad for your health. Studies show that loneliness can harm health and limit your life span as much as high blood pressure and smoking.   Take some time to reflect on your relationships. Then answer these questions:  Are there people in your life that cause you stress or drain your energy? What can you do to set limits?  ________________________________________________________________________________________________________________________________________________________________________________________________________________________________________________________________________________________________________________________________________________  Who do you enjoy spending time with? Who can you go to for support?  ________________________________________________________________________________________________________________________________________________________________________________________________________________________________________________________________________________________________________________________________________________  What can you do to improve your relationships with others?  __________________________________________________________________________________________________________________________________________________________________________________________________________________  ______________________________________________________________________________________________________________________________  What do you like most about your relationships with  others?  ________________________________________________________________________________________________________________________________________________________________________________________________________________________________________________________________________________________________________________________________________________  My goal: ______________________________________________________________________  I will ______________________________________________________________________________________________________________________________________________________________________________________________    For informational purposes only. Not to replace the advice of your health care provider. Copyright   2018 [facility]. All rights reserved. Clinically reviewed by Bariatric Health  Team. [employer] 547687 - Rev 04/21.    Learning About Stress  What is stress?     Stress is your body's response to a hard situation. Your body can have a physical, emotional, or mental response. Stress is a fact of life for most people, and it affects everyone differently. What causes stress for you may not be stressful for someone else.  A lot of things can cause stress. You may feel stress when you go on a job interview, take a test, or run a race. This kind of short-term stress is normal and even useful. It can help you if you need to work hard or react quickly. For example, stress can help you finish an important job on time.  Long-term stress is caused by ongoing stressful situations or events. Examples of long-term stress include long-term health problems, ongoing problems at work, or conflicts in your family. Long-term stress can harm your health.  How does stress affect your health?  When you are stressed, your body responds as though you are in danger. It makes hormones that speed up your heart, make you breathe faster, and give you a burst of energy. This is called the fight-or-flight stress  response. If the stress is over quickly, your body goes back to normal and no harm is done.  But if stress happens too often or lasts too long, it can have bad effects. Long-term stress can make you more likely to get sick, and it can make symptoms of some diseases worse. If you tense up when you are stressed, you may develop neck, shoulder, or low back pain. Stress is linked to high blood pressure and heart disease.  Stress also harms your emotional health. It can make you rodriguez, tense, or depressed. Your relationships may suffer, and you may not do well at work or school.  What can you do to manage stress?  You can try these things to help manage stress:   Do something active. Exercise or activity can help reduce stress. Walking is a great way to get started. Even everyday activities such as housecleaning or yard work can help.  Try yoga or maryann chi. These techniques combine exercise and meditation. You may need some training at first to learn them.  Do something you enjoy. For example, listen to music or go to a movie. Practice your hobby or do volunteer work.  Meditate. This can help you relax, because you are not worrying about what happened before or what may happen in the future.  Do guided imagery. Imagine yourself in any setting that helps you feel calm. You can use online videos, books, or a teacher to guide you.  Do breathing exercises. For example:  From a standing position, bend forward from the waist with your knees slightly bent. Let your arms dangle close to the floor.  Breathe in slowly and deeply as you return to a standing position. Roll up slowly and lift your head last.  Hold your breath for just a few seconds in the standing position.  Breathe out slowly and bend forward from the waist.  Let your feelings out. Talk, laugh, cry, and express anger when you need to. Talking with supportive friends or family, a counselor, or a james leader about your feelings is a healthy way to relieve stress.  "Avoid discussing your feelings with people who make you feel worse.  Write. It may help to write about things that are bothering you. This helps you find out how much stress you feel and what is causing it. When you know this, you can find better ways to cope.  What can you do to prevent stress?  You might try some of these things to help prevent stress:  Manage your time. This helps you find time to do the things you want and need to do.  Get enough sleep. Your body recovers from the stresses of the day while you are sleeping.  Get support. Your family, friends, and community can make a difference in how you experience stress.  Limit your news feed. Avoid or limit time on social media or news that may make you feel stressed.  Do something active. Exercise or activity can help reduce stress. Walking is a great way to get started.  Where can you learn more?  Go to https://www.[employer].net/patiented  Enter N032 in the search box to learn more about \"Learning About Stress.\"  Current as of: October 24, 2023               Content Version: 14.0    0442-2972 [employer].   Care instructions adapted under license by your healthcare professional. If you have questions about a medical condition or this instruction, always ask your healthcare professional. [employer] disclaims any warranty or liability for your use of this information.      "

## 2024-03-23 LAB
ALBUMIN SERPL BCG-MCNC: 4.6 G/DL (ref 3.5–5.2)
ALP SERPL-CCNC: 89 U/L (ref 40–150)
ALT SERPL W P-5'-P-CCNC: 28 U/L (ref 0–70)
ANION GAP SERPL CALCULATED.3IONS-SCNC: 10 MMOL/L (ref 7–15)
AST SERPL W P-5'-P-CCNC: 22 U/L (ref 0–45)
BILIRUB SERPL-MCNC: 0.6 MG/DL
BUN SERPL-MCNC: 13.7 MG/DL (ref 6–20)
CALCIUM SERPL-MCNC: 9.7 MG/DL (ref 8.6–10)
CHLORIDE SERPL-SCNC: 105 MMOL/L (ref 98–107)
CHOLEST SERPL-MCNC: 204 MG/DL
CREAT SERPL-MCNC: 1.04 MG/DL (ref 0.67–1.17)
DEPRECATED HCO3 PLAS-SCNC: 26 MMOL/L (ref 22–29)
EGFRCR SERPLBLD CKD-EPI 2021: >90 ML/MIN/1.73M2
FASTING STATUS PATIENT QL REPORTED: YES
GLUCOSE SERPL-MCNC: 83 MG/DL (ref 70–99)
HDLC SERPL-MCNC: 51 MG/DL
LDLC SERPL CALC-MCNC: 135 MG/DL
NONHDLC SERPL-MCNC: 153 MG/DL
POTASSIUM SERPL-SCNC: 3.9 MMOL/L (ref 3.4–5.3)
PROT SERPL-MCNC: 7.1 G/DL (ref 6.4–8.3)
SHBG SERPL-SCNC: 12 NMOL/L (ref 11–80)
SODIUM SERPL-SCNC: 141 MMOL/L (ref 135–145)
TRIGL SERPL-MCNC: 92 MG/DL
TSH SERPL DL<=0.005 MIU/L-ACNC: 1.55 UIU/ML (ref 0.3–4.2)
URATE SERPL-MCNC: 5.3 MG/DL (ref 3.4–7)

## 2024-03-26 LAB
TESTOST FREE SERPL-MCNC: 13.31 NG/DL
TESTOST SERPL-MCNC: 421 NG/DL (ref 240–950)

## 2024-04-16 ENCOUNTER — MYC MEDICAL ADVICE (OUTPATIENT)
Dept: FAMILY MEDICINE | Facility: CLINIC | Age: 44
End: 2024-04-16
Payer: COMMERCIAL

## 2024-04-16 DIAGNOSIS — M1A.09X0 IDIOPATHIC CHRONIC GOUT OF MULTIPLE SITES WITHOUT TOPHUS: Primary | ICD-10-CM

## 2024-04-16 RX ORDER — ALLOPURINOL 100 MG/1
200 TABLET ORAL DAILY
Qty: 180 TABLET | Refills: 3 | Status: SHIPPED | OUTPATIENT
Start: 2024-04-16

## 2024-04-16 RX ORDER — ALLOPURINOL 100 MG/1
100 TABLET ORAL DAILY
Qty: 180 TABLET | Refills: 2 | Status: CANCELLED | OUTPATIENT
Start: 2024-04-16

## 2024-05-06 ENCOUNTER — MYC MEDICAL ADVICE (OUTPATIENT)
Dept: FAMILY MEDICINE | Facility: CLINIC | Age: 44
End: 2024-05-06
Payer: COMMERCIAL

## 2024-05-06 DIAGNOSIS — N52.8 OTHER MALE ERECTILE DYSFUNCTION: Primary | ICD-10-CM

## 2024-05-06 NOTE — TELEPHONE ENCOUNTER
LOV 3/22/24    Other male erectile dysfunction  Will check labs for contributing causes and trial sildenafil for improvement in symptoms.  - Testosterone Free and Total; Future  - sildenafil (VIAGRA) 50 MG tablet; Take 1 tablet (50 mg) by mouth daily as needed (erections)  - Testosterone Free and Total

## 2024-05-07 RX ORDER — TADALAFIL 10 MG/1
TABLET ORAL
Qty: 30 TABLET | Refills: 1 | Status: SHIPPED | OUTPATIENT
Start: 2024-05-07 | End: 2024-08-20

## 2024-08-20 ENCOUNTER — MYC REFILL (OUTPATIENT)
Dept: FAMILY MEDICINE | Facility: CLINIC | Age: 44
End: 2024-08-20
Payer: COMMERCIAL

## 2024-08-20 ENCOUNTER — MYC MEDICAL ADVICE (OUTPATIENT)
Dept: FAMILY MEDICINE | Facility: CLINIC | Age: 44
End: 2024-08-20
Payer: COMMERCIAL

## 2024-08-20 DIAGNOSIS — G47.33 OBSTRUCTIVE SLEEP APNEA (ADULT) (PEDIATRIC): Primary | ICD-10-CM

## 2024-08-20 DIAGNOSIS — N52.8 OTHER MALE ERECTILE DYSFUNCTION: ICD-10-CM

## 2024-08-20 RX ORDER — TADALAFIL 10 MG/1
TABLET ORAL
Qty: 30 TABLET | Refills: 1 | Status: SHIPPED | OUTPATIENT
Start: 2024-08-20

## 2024-08-26 PROBLEM — G47.33 OBSTRUCTIVE SLEEP APNEA (ADULT) (PEDIATRIC): Status: ACTIVE | Noted: 2024-08-26

## 2024-10-11 ENCOUNTER — E-VISIT (OUTPATIENT)
Dept: FAMILY MEDICINE | Facility: CLINIC | Age: 44
End: 2024-10-11
Payer: COMMERCIAL

## 2024-10-11 DIAGNOSIS — M1A.09X0 IDIOPATHIC CHRONIC GOUT OF MULTIPLE SITES WITHOUT TOPHUS: ICD-10-CM

## 2024-10-11 DIAGNOSIS — R73.03 PREDIABETES: Primary | ICD-10-CM

## 2024-10-11 PROCEDURE — 99421 OL DIG E/M SVC 5-10 MIN: CPT | Performed by: INTERNAL MEDICINE

## 2024-10-11 ASSESSMENT — ASTHMA QUESTIONNAIRES
QUESTION_4 LAST FOUR WEEKS HOW OFTEN HAVE YOU USED YOUR RESCUE INHALER OR NEBULIZER MEDICATION (SUCH AS ALBUTEROL): NOT AT ALL
QUESTION_3 LAST FOUR WEEKS HOW OFTEN DID YOUR ASTHMA SYMPTOMS (WHEEZING, COUGHING, SHORTNESS OF BREATH, CHEST TIGHTNESS OR PAIN) WAKE YOU UP AT NIGHT OR EARLIER THAN USUAL IN THE MORNING: NOT AT ALL
QUESTION_5 LAST FOUR WEEKS HOW WOULD YOU RATE YOUR ASTHMA CONTROL: COMPLETELY CONTROLLED
QUESTION_2 LAST FOUR WEEKS HOW OFTEN HAVE YOU HAD SHORTNESS OF BREATH: NOT AT ALL
QUESTION_1 LAST FOUR WEEKS HOW MUCH OF THE TIME DID YOUR ASTHMA KEEP YOU FROM GETTING AS MUCH DONE AT WORK, SCHOOL OR AT HOME: NONE OF THE TIME
ACT_TOTALSCORE: 25
ACT_TOTALSCORE: 25

## 2024-10-11 NOTE — PATIENT INSTRUCTIONS
Good afternoon,     I have ordered these labs for you and you can come in for a lab only appointment for these.      Also- please fill out these attached questions to let me know how asthma is.     Sincerely,     Wily Lord MD

## 2024-10-13 ENCOUNTER — LAB (OUTPATIENT)
Dept: LAB | Facility: CLINIC | Age: 44
End: 2024-10-13
Payer: COMMERCIAL

## 2024-10-13 DIAGNOSIS — M1A.09X0 IDIOPATHIC CHRONIC GOUT OF MULTIPLE SITES WITHOUT TOPHUS: ICD-10-CM

## 2024-10-13 DIAGNOSIS — R73.03 PREDIABETES: ICD-10-CM

## 2024-10-13 LAB
ANION GAP SERPL CALCULATED.3IONS-SCNC: 12 MMOL/L (ref 7–15)
BUN SERPL-MCNC: 15.8 MG/DL (ref 6–20)
CALCIUM SERPL-MCNC: 9.4 MG/DL (ref 8.8–10.4)
CHLORIDE SERPL-SCNC: 105 MMOL/L (ref 98–107)
CREAT SERPL-MCNC: 1 MG/DL (ref 0.67–1.17)
EGFRCR SERPLBLD CKD-EPI 2021: >90 ML/MIN/1.73M2
EST. AVERAGE GLUCOSE BLD GHB EST-MCNC: 114 MG/DL
GLUCOSE SERPL-MCNC: 91 MG/DL (ref 70–99)
HBA1C MFR BLD: 5.6 %
HCO3 SERPL-SCNC: 24 MMOL/L (ref 22–29)
POTASSIUM SERPL-SCNC: 4.4 MMOL/L (ref 3.4–5.3)
SODIUM SERPL-SCNC: 141 MMOL/L (ref 135–145)
URATE SERPL-MCNC: 5.3 MG/DL (ref 3.4–7)

## 2024-10-13 PROCEDURE — 36415 COLL VENOUS BLD VENIPUNCTURE: CPT

## 2024-10-13 PROCEDURE — 84550 ASSAY OF BLOOD/URIC ACID: CPT

## 2024-10-13 PROCEDURE — 83036 HEMOGLOBIN GLYCOSYLATED A1C: CPT

## 2024-10-13 PROCEDURE — 80048 BASIC METABOLIC PNL TOTAL CA: CPT

## 2024-12-23 DIAGNOSIS — N52.8 OTHER MALE ERECTILE DYSFUNCTION: ICD-10-CM

## 2024-12-24 RX ORDER — TADALAFIL 10 MG/1
TABLET ORAL
Qty: 30 TABLET | Refills: 1 | Status: SHIPPED | OUTPATIENT
Start: 2024-12-24

## 2025-04-07 ENCOUNTER — OFFICE VISIT (OUTPATIENT)
Dept: FAMILY MEDICINE | Facility: CLINIC | Age: 45
End: 2025-04-07
Attending: INTERNAL MEDICINE
Payer: COMMERCIAL

## 2025-04-07 VITALS
RESPIRATION RATE: 14 BRPM | HEIGHT: 68 IN | HEART RATE: 70 BPM | SYSTOLIC BLOOD PRESSURE: 114 MMHG | TEMPERATURE: 97.3 F | OXYGEN SATURATION: 97 % | BODY MASS INDEX: 31.02 KG/M2 | DIASTOLIC BLOOD PRESSURE: 60 MMHG | WEIGHT: 204.7 LBS

## 2025-04-07 DIAGNOSIS — M1A.09X0 IDIOPATHIC CHRONIC GOUT OF MULTIPLE SITES WITHOUT TOPHUS: ICD-10-CM

## 2025-04-07 DIAGNOSIS — R73.03 PREDIABETES: Primary | ICD-10-CM

## 2025-04-07 DIAGNOSIS — Z00.00 ROUTINE HISTORY AND PHYSICAL EXAMINATION OF ADULT: ICD-10-CM

## 2025-04-07 DIAGNOSIS — G47.33 OBSTRUCTIVE SLEEP APNEA (ADULT) (PEDIATRIC): ICD-10-CM

## 2025-04-07 DIAGNOSIS — Z12.11 SCREENING FOR COLON CANCER: ICD-10-CM

## 2025-04-07 DIAGNOSIS — M71.22 SYNOVIAL CYST OF LEFT POPLITEAL SPACE: ICD-10-CM

## 2025-04-07 DIAGNOSIS — J45.20 MILD INTERMITTENT ASTHMA WITHOUT COMPLICATION: ICD-10-CM

## 2025-04-07 DIAGNOSIS — N52.8 OTHER MALE ERECTILE DYSFUNCTION: ICD-10-CM

## 2025-04-07 PROCEDURE — G2211 COMPLEX E/M VISIT ADD ON: HCPCS | Performed by: INTERNAL MEDICINE

## 2025-04-07 PROCEDURE — 99396 PREV VISIT EST AGE 40-64: CPT | Performed by: INTERNAL MEDICINE

## 2025-04-07 PROCEDURE — 3074F SYST BP LT 130 MM HG: CPT | Performed by: INTERNAL MEDICINE

## 2025-04-07 PROCEDURE — 3078F DIAST BP <80 MM HG: CPT | Performed by: INTERNAL MEDICINE

## 2025-04-07 PROCEDURE — 99214 OFFICE O/P EST MOD 30 MIN: CPT | Mod: 25 | Performed by: INTERNAL MEDICINE

## 2025-04-07 RX ORDER — ALLOPURINOL 100 MG/1
200 TABLET ORAL DAILY
Qty: 180 TABLET | Refills: 3 | Status: SHIPPED | OUTPATIENT
Start: 2025-04-07

## 2025-04-07 RX ORDER — TADALAFIL 10 MG/1
TABLET ORAL
Qty: 30 TABLET | Refills: 1 | Status: SHIPPED | OUTPATIENT
Start: 2025-04-07

## 2025-04-07 RX ORDER — ALBUTEROL SULFATE 90 UG/1
2 INHALANT RESPIRATORY (INHALATION) EVERY 6 HOURS PRN
Qty: 36 G | Refills: 11 | Status: SHIPPED | OUTPATIENT
Start: 2025-04-07

## 2025-04-07 RX ORDER — CLINDAMYCIN PHOSPHATE 10 MG/ML
SOLUTION TOPICAL
COMMUNITY
Start: 2024-11-25

## 2025-04-07 SDOH — HEALTH STABILITY: PHYSICAL HEALTH: ON AVERAGE, HOW MANY MINUTES DO YOU ENGAGE IN EXERCISE AT THIS LEVEL?: 60 MIN

## 2025-04-07 SDOH — HEALTH STABILITY: PHYSICAL HEALTH: ON AVERAGE, HOW MANY DAYS PER WEEK DO YOU ENGAGE IN MODERATE TO STRENUOUS EXERCISE (LIKE A BRISK WALK)?: 4 DAYS

## 2025-04-07 ASSESSMENT — ASTHMA QUESTIONNAIRES
ACT_TOTALSCORE: 24
ACT_TOTALSCORE: 24
QUESTION_2 LAST FOUR WEEKS HOW OFTEN HAVE YOU HAD SHORTNESS OF BREATH: NOT AT ALL
QUESTION_4 LAST FOUR WEEKS HOW OFTEN HAVE YOU USED YOUR RESCUE INHALER OR NEBULIZER MEDICATION (SUCH AS ALBUTEROL): NOT AT ALL
QUESTION_1 LAST FOUR WEEKS HOW MUCH OF THE TIME DID YOUR ASTHMA KEEP YOU FROM GETTING AS MUCH DONE AT WORK, SCHOOL OR AT HOME: NONE OF THE TIME
QUESTION_3 LAST FOUR WEEKS HOW OFTEN DID YOUR ASTHMA SYMPTOMS (WHEEZING, COUGHING, SHORTNESS OF BREATH, CHEST TIGHTNESS OR PAIN) WAKE YOU UP AT NIGHT OR EARLIER THAN USUAL IN THE MORNING: NOT AT ALL
QUESTION_5 LAST FOUR WEEKS HOW WOULD YOU RATE YOUR ASTHMA CONTROL: WELL CONTROLLED

## 2025-04-07 ASSESSMENT — SOCIAL DETERMINANTS OF HEALTH (SDOH): HOW OFTEN DO YOU GET TOGETHER WITH FRIENDS OR RELATIVES?: PATIENT DECLINED

## 2025-04-07 NOTE — PROGRESS NOTES
Preventive Care Visit  [facility]  Wily Lord MD, Internal Medicine - Pediatrics  Apr 7, 2025      Assessment & Plan     Prediabetes:  - Prediabetes noted in the past, associated with steroid use for gout in the past.   - Check hemoglobin A1c to monitor glucose levels.    Idiopathic chronic gout of multiple sites without tophus:  - Recent flare-up managed with maximum dosage of colchicine and allopurinol. Improvement noted within two days. Greatly improved from the past  - Continue daily allopurinol. Check uric acid levels.    Screening for colon cancer:  - Insurance issues with coverage due to age and diagnostic coding for hemorrhoids.  - Resubmit referral as routine screening. Investigate family history of colon polyps to potentially adjust screening age.  - Risks and side effects: Potential risk of missing underlying conditions if not screened.    Other male erectile dysfunction:  - Cialis preferred over Viagra due to fewer side effects (headaches).  - Refill Cialis prescription.    Synovial cyst of left popliteal space:  - Likely Baker's cyst causing pain behind the knee, possibly due to standing at work.  - Ice and monitor symptoms. Referral to orthopedics if symptoms worsen.    Obstructive sleep apnea (adult) (pediatric):  - Continue monitoring symptoms and consider further evaluation if necessary. On cpap    Mild intermittent asthma without complication:  - Monitor symptoms and ensure availability of albuterol inhaler.    Routine history and physical examination of adult:  - Continue regular check-ups and monitoring of chronic conditions.  - Ensure follow-up on all discussed health issues and screenings.    Note: This note was formulated using AI scribe technology and may have unintentional omissions.    Consent was obtained from the patient to use an AI documentation tool in the creation of this note.            BMI  Estimated body mass index is 31.12 kg/m  as calculated from  "the following:    Height as of this encounter: 1.727 m (5' 8\").    Weight as of this encounter: 92.9 kg (204 lb 11.2 oz).   Weight management plan: Discussed healthy diet and exercise guidelines    Counseling  Appropriate preventive services were addressed with this patient via screening, questionnaire, or discussion as appropriate for fall prevention, nutrition, physical activity, Tobacco-use cessation, social engagement, weight loss and cognition.  Checklist reviewing preventive services available has been given to the patient.  Reviewed patient's diet, addressing concerns and/or questions.     The longitudinal plan of care for the diagnosis(es)/condition(s) as documented were addressed during this visit. Due to the added complexity in care, I will continue to support Zack in the subsequent management and with ongoing continuity of care.      Subjective   Zack is a 44 year old, presenting for the following:  Physical        4/7/2025     4:37 PM   Additional Questions   Roomed by PAM RIVAS  Zack Nelson, age 44 years    Constipation and Hemorrhoids  - Had a colonoscopy scheduled but had to reschedule multiple times due to work.  - Insurance issues arose because he is under 45, and the procedure was marked as diagnostic due to constipation and bloody stools.  - Reports improvement in symptoms after starting a routine of drinking a glass of fruit juice in the morning.  - Occasionally takes magnesium oxide, initially intended for sleep, which also helps with constipation.  - No further hemorrhoid issues reported since starting the fruit juice routine.  - Experiences occasional constipation but manages it with fruit juice.    Acne  - Reports breaking out a lot over the last month, particularly on the back.  - Noticed breakouts are usually triggered by eating hot dogs or sausage.  - Visited a dermatologist two months ago, but the back was clear at that time.  - Uses wipes provided by the dermatologist, " which help occasionally.    Gout  - Experienced a flare-up about a month and a half ago, primarily affecting the right foot, whereas it usually affects the left foot.  - Managed the flare-up by maxing out on the dosage of Colchicine and allopurinol, which resolved the issue in two days.  - Continues to take allopurinol daily.    Leg Pain  - Reports leg pain in the back of the calf, particularly when bending the leg, crossing legs, or kneeling.  - Pain has been present for a couple of months.  - No pain during leg workouts or walking, only when bending or kneeling.  - Suspects the pain might be related to doing squats and working out.    Asthma  - Reports moderate asthma but believes it should be mild, if any, since the main trigger, a cat, passed away.  - No significant breathing issues since the cat's passing, except in extremely cold weather or during intense physical exertion.  - No recent asthma attacks or wheezing reported.    Weight and Exercise  - Reports weight gain, currently around 208 lbs, previously as low as 194 lbs when doing more cardio.  - Currently focuses more on weightlifting rather than cardio.        3/19/2024    10:17 AM 10/11/2024     3:59 PM 4/7/2025     4:41 PM   ACT Total Scores   ACT TOTAL SCORE (Goal Greater than or Equal to 20) 23 25 24   In the past 12 months, how many times did you visit the emergency room for your asthma without being admitted to the hospital? 0 0 0   In the past 12 months, how many times were you hospitalized overnight because of your asthma? 0 0 0     Wt Readings from Last 4 Encounters:   04/07/25 92.9 kg (204 lb 11.2 oz)   03/22/24 94.3 kg (208 lb)   10/17/23 98 kg (216 lb)   01/17/23 98 kg (216 lb)             Advance Care Planning  Patient does not have a Health Care Directive: Discussed advance care planning with patient; however, patient declined at this time.      4/7/2025   General Health   How would you rate your overall physical health? Good   Feel stress  (tense, anxious, or unable to sleep) Only a little   (!) STRESS CONCERN      4/7/2025   Nutrition   Three or more servings of calcium each day? Yes   Diet: Regular (no restrictions)   How many servings of fruit and vegetables per day? (!) 0-1   How many sweetened beverages each day? 0-1         4/7/2025   Exercise   Days per week of moderate/strenous exercise 4 days   Average minutes spent exercising at this level 60 min         4/7/2025   Social Factors   Frequency of gathering with friends or relatives Patient declined   Worry food won't last until get money to buy more No   Food not last or not have enough money for food? No   Do you have housing? (Housing is defined as stable permanent housing and does not include staying ouside in a car, in a tent, in an abandoned building, in an overnight shelter, or couch-surfing.) Yes   Are you worried about losing your housing? No   Lack of transportation? No   Unable to get utilities (heat,electricity)? No         4/7/2025   Dental   Dentist two times every year? Yes           Today's PHQ-2 Score:       4/7/2025    10:46 AM   PHQ-2 ( 1999 [employer])   Q1: Little interest or pleasure in doing things 0   Q2: Feeling down, depressed or hopeless 0   PHQ-2 Score 0    Q1: Little interest or pleasure in doing things Not at all   Q2: Feeling down, depressed or hopeless Not at all   PHQ-2 Score 0       Patient-reported           4/7/2025   Substance Use   Alcohol more than 3/day or more than 7/wk Not Applicable   Do you use any other substances recreationally? No     Social History     Tobacco Use    Smoking status: Never     Passive exposure: Never    Smokeless tobacco: Never   Vaping Use    Vaping status: Never Used   Substance Use Topics    Alcohol use: Not Currently     Alcohol/week: 2.0 standard drinks of alcohol    Drug use: Never           4/7/2025   STI Screening   New sexual partner(s) since last STI/HIV test? No   ASCVD Risk   The 10-year ASCVD risk score (Sheridan  "JENNIFER, et al., 2019) is: 1.3%    Values used to calculate the score:      Age: 44 years      Sex: Male      Is Non- : No      Diabetic: No      Tobacco smoker: No      Systolic Blood Pressure: 114 mmHg      Is BP treated: No      HDL Cholesterol: 51 mg/dL      Total Cholesterol: 204 mg/dL        4/7/2025   Contraception/Family Planning   Questions about contraception or family planning No        Reviewed and updated as needed this visit by Provider   Tobacco  Allergies  Meds  Problems  Med Hx  Surg Hx  Fam Hx                  Review of Systems  Constitutional, HEENT, cardiovascular, pulmonary, gi and gu systems are negative, except as otherwise noted.     Objective    Exam  /60 (BP Location: Right arm, Patient Position: Sitting, Cuff Size: Adult Large)   Pulse 70   Temp 97.3  F (36.3  C)   Resp 14   Ht 1.727 m (5' 8\")   Wt 92.9 kg (204 lb 11.2 oz)   SpO2 97%   BMI 31.12 kg/m     Estimated body mass index is 31.12 kg/m  as calculated from the following:    Height as of this encounter: 1.727 m (5' 8\").    Weight as of this encounter: 92.9 kg (204 lb 11.2 oz).    Physical Exam  GENERAL: alert and no distress  EYES: Eyes grossly normal to inspection, PERRL and conjunctivae and sclerae normal  HENT: ear canals and TM's normal, nose and mouth without ulcers or lesions  NECK: no adenopathy, no asymmetry, masses, or scars  RESP: lungs clear to auscultation - no rales, rhonchi or wheezes  CV: regular rate and rhythm, normal S1 S2, no S3 or S4, no murmur, click or rub, no peripheral edema  ABDOMEN: soft, nontender, no hepatosplenomegaly, no masses and bowel sounds normal  MS: tender in the left popliteal area, no palpable cords, no gross musculoskeletal defects noted, no edema  SKIN: no suspicious lesions or rashes  NEURO: Normal strength and tone, mentation intact and speech normal  PSYCH: mentation appears normal, affect normal/bright        Signed Electronically by: Wily Campos " MD Risa

## 2025-04-07 NOTE — PATIENT INSTRUCTIONS
Keep up the great work staying active.     Labs tomorrow as we discussed.     Let me know if any family members that have had colon polyps    Refilled meds- let me know if issues.     Let me know if pain gets worse behind the knee area.     Wily Lord MD      Patient Education   Preventive Care Advice   This is general advice given by our system to help you stay healthy. However, your care team may have specific advice just for you. Please talk to your care team about your preventive care needs.  Nutrition  Eat 5 or more servings of fruits and vegetables each day.  Try wheat bread, brown rice and whole grain pasta (instead of white bread, rice, and pasta).  Get enough calcium and vitamin D. Check the label on foods and aim for 100% of the RDA (recommended daily allowance).  Lifestyle  Exercise at least 150 minutes each week  (30 minutes a day, 5 days a week).  Do muscle strengthening activities 2 days a week. These help control your weight and prevent disease.  No smoking.  Wear sunscreen to prevent skin cancer.  Have a dental exam and cleaning every 6 months.  Yearly exams  See your health care team every year to talk about:  Any changes in your health.  Any medicines your care team has prescribed.  Preventive care, family planning, and ways to prevent chronic diseases.  Shots (vaccines)   HPV shots (up to age 26), if you've never had them before.  Hepatitis B shots (up to age 59), if you've never had them before.  COVID-19 shot: Get this shot when it's due.  Flu shot: Get a flu shot every year.  Tetanus shot: Get a tetanus shot every 10 years.  Pneumococcal, hepatitis A, and RSV shots: Ask your care team if you need these based on your risk.  Shingles shot (for age 50 and up)  General health tests  Diabetes screening:  Starting at age 35, Get screened for diabetes at least every 3 years.  If you are younger than age 35, ask your care team if you should be screened for diabetes.  Cholesterol test: At age 39, start  having a cholesterol test every 5 years, or more often if advised.  Bone density scan (DEXA): At age 50, ask your care team if you should have this scan for osteoporosis (brittle bones).  Hepatitis C: Get tested at least once in your life.  STIs (sexually transmitted infections)  Before age 24: Ask your care team if you should be screened for STIs.  After age 24: Get screened for STIs if you're at risk. You are at risk for STIs (including HIV) if:  You are sexually active with more than one person.  You don't use condoms every time.  You or a partner was diagnosed with a sexually transmitted infection.  If you are at risk for HIV, ask about PrEP medicine to prevent HIV.  Get tested for HIV at least once in your life, whether you are at risk for HIV or not.  Cancer screening tests  Cervical cancer screening: If you have a cervix, begin getting regular cervical cancer screening tests starting at age 21.  Breast cancer scan (mammogram): If you've ever had breasts, begin having regular mammograms starting at age 40. This is a scan to check for breast cancer.  Colon cancer screening: It is important to start screening for colon cancer at age 45.  Have a colonoscopy test every 10 years (or more often if you're at risk) Or, ask your provider about stool tests like a FIT test every year or Cologuard test every 3 years.  To learn more about your testing options, visit:   .  For help making a decision, visit:   https://bit.ly/cr31080.  Prostate cancer screening test: If you have a prostate, ask your care team if a prostate cancer screening test (PSA) at age 55 is right for you.  Lung cancer screening: If you are a current or former smoker ages 50 to 80, ask your care team if ongoing lung cancer screenings are right for you.  For informational purposes only. Not to replace the advice of your health care provider. Copyright   2023 [employer]. All rights reserved. Clinically reviewed by the [facility]  Transitions Program. [employer] [id number] - REV 01/24.

## 2025-04-10 ENCOUNTER — LAB (OUTPATIENT)
Dept: LAB | Facility: CLINIC | Age: 45
End: 2025-04-10
Payer: COMMERCIAL

## 2025-04-10 DIAGNOSIS — Z00.00 ROUTINE HISTORY AND PHYSICAL EXAMINATION OF ADULT: ICD-10-CM

## 2025-04-10 DIAGNOSIS — R73.03 PREDIABETES: ICD-10-CM

## 2025-04-10 LAB
ALBUMIN SERPL BCG-MCNC: 4.6 G/DL (ref 3.5–5.2)
ALP SERPL-CCNC: 91 U/L (ref 40–150)
ALT SERPL W P-5'-P-CCNC: 36 U/L (ref 0–70)
ANION GAP SERPL CALCULATED.3IONS-SCNC: 10 MMOL/L (ref 7–15)
AST SERPL W P-5'-P-CCNC: 31 U/L (ref 0–45)
BILIRUB SERPL-MCNC: 0.5 MG/DL
BUN SERPL-MCNC: 19.1 MG/DL (ref 6–20)
CALCIUM SERPL-MCNC: 9.9 MG/DL (ref 8.8–10.4)
CHLORIDE SERPL-SCNC: 106 MMOL/L (ref 98–107)
CHOLEST SERPL-MCNC: 187 MG/DL
CREAT SERPL-MCNC: 1.01 MG/DL (ref 0.67–1.17)
EGFRCR SERPLBLD CKD-EPI 2021: >90 ML/MIN/1.73M2
ERYTHROCYTE [DISTWIDTH] IN BLOOD BY AUTOMATED COUNT: 12.5 % (ref 10–15)
EST. AVERAGE GLUCOSE BLD GHB EST-MCNC: 117 MG/DL
FASTING STATUS PATIENT QL REPORTED: YES
GLUCOSE SERPL-MCNC: 96 MG/DL (ref 70–99)
HBA1C MFR BLD: 5.7 %
HCO3 SERPL-SCNC: 26 MMOL/L (ref 22–29)
HCT VFR BLD AUTO: 45.5 % (ref 40–53)
HDLC SERPL-MCNC: 51 MG/DL
HGB BLD-MCNC: 15 G/DL (ref 13.3–17.7)
LDLC SERPL CALC-MCNC: 114 MG/DL
MCH RBC QN AUTO: 29.7 PG (ref 26.5–33)
MCHC RBC AUTO-ENTMCNC: 33 G/DL (ref 31.5–36.5)
MCV RBC AUTO: 90 FL (ref 78–100)
NONHDLC SERPL-MCNC: 136 MG/DL
PLATELET # BLD AUTO: 230 10E3/UL (ref 150–450)
POTASSIUM SERPL-SCNC: 4.6 MMOL/L (ref 3.4–5.3)
PROT SERPL-MCNC: 7 G/DL (ref 6.4–8.3)
RBC # BLD AUTO: 5.05 10E6/UL (ref 4.4–5.9)
SHBG SERPL-SCNC: 26 NMOL/L (ref 11–80)
SODIUM SERPL-SCNC: 142 MMOL/L (ref 135–145)
TRIGL SERPL-MCNC: 110 MG/DL
URATE SERPL-MCNC: 5.6 MG/DL (ref 3.4–7)
WBC # BLD AUTO: 5.2 10E3/UL (ref 4–11)

## 2025-04-10 PROCEDURE — 36415 COLL VENOUS BLD VENIPUNCTURE: CPT

## 2025-04-10 PROCEDURE — 82465 ASSAY BLD/SERUM CHOLESTEROL: CPT

## 2025-04-10 PROCEDURE — 83036 HEMOGLOBIN GLYCOSYLATED A1C: CPT

## 2025-04-10 PROCEDURE — 85041 AUTOMATED RBC COUNT: CPT

## 2025-04-10 PROCEDURE — 83718 ASSAY OF LIPOPROTEIN: CPT

## 2025-04-10 PROCEDURE — 84403 ASSAY OF TOTAL TESTOSTERONE: CPT

## 2025-04-10 PROCEDURE — 84270 ASSAY OF SEX HORMONE GLOBUL: CPT

## 2025-04-10 PROCEDURE — 85014 HEMATOCRIT: CPT

## 2025-04-10 PROCEDURE — 84550 ASSAY OF BLOOD/URIC ACID: CPT

## 2025-04-10 PROCEDURE — 82040 ASSAY OF SERUM ALBUMIN: CPT

## 2025-04-11 LAB
TESTOST FREE SERPL-MCNC: 9.44 NG/DL
TESTOST SERPL-MCNC: 406 NG/DL (ref 240–950)

## 2025-05-08 ENCOUNTER — TRANSFERRED RECORDS (OUTPATIENT)
Dept: ADMINISTRATIVE | Facility: CLINIC | Age: 45
End: 2025-05-08
Payer: COMMERCIAL

## 2025-05-08 NOTE — PROCEDURES
[facility]   [address], [address], [place] [address]     Patient Name: Zack Nelson  Gender:   Male  Exam Date: 05/08/2025 Visit Number:   [id number]  Age: 44 Years 6 Months YOB: 1980  Attending MD: Bernardino Damon MD Medical Record#:   [id number]  -----------------------------------------------------------------------------------------------------------------------------   Procedure: Colonoscopy   Indications: Rectal bleeding  Referring MD: Wily Lord MD  Primary MD:      Wily Lord MD   Medications: Admitting Medications:   0.9% Normal Saline at [facility]   Intra Procedure Medications:   Patient received monitored anesthesia care.     Complications: No immediate complications  ______________________________________________________________________________  Procedure:   An examination of the heart and lungs was performed and found to be within acceptable limits.  The patient was therefore deemed a reasonable candidate for endoscopy and monitored anesthesia care.   The risks, benefits and plan of the procedure were discussed with the patient and/or patient representative and all questions were answered.  After obtaining informed consent, the patient received monitored anesthesia care and I passed the scope   without difficulty via the rectum to the ileum.  The appendiceal orifice and ic valve were identified.  The scope was retroflexed during the examination  The quality of the prep was good  (Miralax/Gatorade/2 tablets Bisacodyl/Magnesium Citrate).    This was a complete examination throughout the entire colon.    Findings:    Normal finding.  Location - ileum.    Hemorrhoids.  Internal hemorrhoids without bleeding.    The entire colon was normal.    Impression:  Hemorrhoids, internal   Rectal bleeding   Internal hemorrhoids were found on your exam.  Please review the information packet provided to you.  If you are having symptoms and would like to consider treatment, please  let us know.    Plan  Repeat colonoscopy in 10 years.  If you have signs or symptoms of lower GI illness or a new diagnosis of colon cancer in an immediate family member, you should contact your GI provider or your  primary provider to discuss whether your next exam should be repeated sooner.    We will attempt to contact you at appropriate intervals via [place] mail.  We may not be able to find you or contact you at that time, therefore you should know that the responsibility for following our recommendation rests with you.  If you don't hear from us at the time your procedure is due, please contact our office to schedule an appointment.  If your contact information should change, please contact our office so that we can update your records.  Return to your primary care provider  as needed.    Orders    Office Procedures:  Service Comments Timeframe Assessment   Hemorrhoid Banding  First Available K64.8     Instruction(s)/Education:  Instruction/Education Timeframe Assessment   Hemorrhoids  K64.8     Patient Education given to patient:  Colon:  Hemorrhoids  Hemorrhoid Banding        Electronically signed by:___________________  Bernardino Damon MD                 05/08/2025    Medications:  Medication Dose Sig Description PRN Status PRN Reason Comments   albuterol sulfate HFA 90 mcg/actuation aerosol inhaler 90 mcg inhale 2 puff by inhalation route  every 4 hours as needed N  taking as directed   allopurinol 100 mg tablet 100 mg take 2 tablet by oral route  every day N  taking as directed     Allergies:  Medication Name Ingredient Reaction Comment    NO KNOWN DRUG ALLERGIES       Vital Signs:  Date Time Systolic Diastolic Height Weight BMI   05/08/2025 1040  79 68 in 197.59 30.00     Race:    Preferred Language:  English      cc: Wily Lord MD  cc: Wily Lord MD        [facility] [phone]

## 2025-05-30 PROBLEM — G47.33 OSA (OBSTRUCTIVE SLEEP APNEA): Status: ACTIVE | Noted: 2024-08-26

## 2025-07-19 ENCOUNTER — HEALTH MAINTENANCE LETTER (OUTPATIENT)
Age: 45
End: 2025-07-19

## 2025-07-31 ENCOUNTER — OFFICE VISIT (OUTPATIENT)
Dept: URGENT CARE | Facility: URGENT CARE | Age: 45
End: 2025-07-31
Payer: COMMERCIAL

## 2025-07-31 VITALS
DIASTOLIC BLOOD PRESSURE: 75 MMHG | SYSTOLIC BLOOD PRESSURE: 115 MMHG | TEMPERATURE: 98 F | BODY MASS INDEX: 30.77 KG/M2 | WEIGHT: 203 LBS | HEIGHT: 68 IN | HEART RATE: 62 BPM | OXYGEN SATURATION: 98 % | RESPIRATION RATE: 16 BRPM

## 2025-07-31 DIAGNOSIS — J02.0 STREP THROAT: Primary | ICD-10-CM

## 2025-07-31 LAB — DEPRECATED S PYO AG THROAT QL EIA: POSITIVE

## 2025-07-31 RX ORDER — PENICILLIN V POTASSIUM 500 MG/1
500 TABLET, FILM COATED ORAL 2 TIMES DAILY
Qty: 20 TABLET | Refills: 0 | Status: SHIPPED | OUTPATIENT
Start: 2025-07-31 | End: 2025-08-10

## 2025-07-31 NOTE — PROGRESS NOTES
"Assessment & Plan     Strep throat  A couple days of worsening sore throat. Body aches and fever developed yesterday; Tmax 102.5. No known sick contacts. PO intake fine. Vitals reassuring here. Physical exam notable for very swollen tonsils. Rapid strep positive. Will initiate 10d course antibiotics. Went over other supportive cares.   - Streptococcus A Rapid Screen w/Reflex to PCR - Clinic Collect  - penicillin V (VEETID) 500 MG tablet  Dispense: 20 tablet; Refill: 0           Return if symptoms worsen or fail to improve.    Sandeep Aburto MD  [facility] HODA Dixon is a 44 year old male who presents to clinic today for the following health issues:  Chief Complaint   Patient presents with    Fever     Fever and achy since yesterday. Sore throat started on right side.     HPI  Sore throat a couple days, has gotten worse. More so on R side.   Body aches yesterday. 102.5 last night. Used ibuprofen/tylenol to get it down.   No known sick contacts.   No cough, congestion, ear pain, facial pressure.   Eating/drinking fine.   A week ago has wisdom tooth pulled on R side.     Review of Systems  Constitutional, HEENT, cardiovascular, pulmonary, gi and gu systems are negative, except as otherwise noted.      Objective    /75   Pulse 62   Temp 98  F (36.7  C)   Resp 16   Ht 1.727 m (5' 8\")   Wt 92.1 kg (203 lb)   SpO2 98%   BMI 30.87 kg/m    Physical Exam   GENERAL: alert and no distress, appears fatigued but non-toxic  EYES: Eyes grossly normal to inspection, PERRL and conjunctivae and sclerae normal  HENT: ear canals and TM's normal, nose and mouth without ulcers or lesions, tonsil very swollen  NECK: no adenopathy, no asymmetry, masses, or scars  RESP: lungs clear to auscultation - no rales, rhonchi or wheezes, no increased work of breathing  CV: regular rate and rhythm, normal S1 S2, no murmurs  SKIN: no suspicious lesions or rashes on limited exam  NEURO: no focal " deficits  PSYCH: mentation appears normal, affect normal/bright    Recent Results (from the past 24 hours)   Streptococcus A Rapid Screen w/Reflex to PCR - Clinic Collect    Specimen: Throat; Swab   Result Value Ref Range    Group A Strep antigen Positive (A) Negative

## 2025-07-31 NOTE — PROGRESS NOTES
Urgent Care Clinic Visit    Chief Complaint   Patient presents with    Fever     Fever and achy since yesterday. Sore throat started on right side.

## 2025-07-31 NOTE — PATIENT INSTRUCTIONS
START penicillin twice daily for 10 days.     Continue to push fluids and rest. Tylenol/ibuprofen for fevers and pain.